# Patient Record
Sex: FEMALE | Race: WHITE | URBAN - METROPOLITAN AREA
[De-identification: names, ages, dates, MRNs, and addresses within clinical notes are randomized per-mention and may not be internally consistent; named-entity substitution may affect disease eponyms.]

---

## 2019-06-25 ENCOUNTER — OUTPATIENT (OUTPATIENT)
Dept: OUTPATIENT SERVICES | Facility: HOSPITAL | Age: 24
LOS: 1 days | Discharge: HOME | End: 2019-06-25

## 2019-06-25 ENCOUNTER — APPOINTMENT (OUTPATIENT)
Dept: OBGYN | Facility: CLINIC | Age: 24
End: 2019-06-25
Payer: MEDICAID

## 2019-06-25 ENCOUNTER — RESULT CHARGE (OUTPATIENT)
Age: 24
End: 2019-06-25

## 2019-06-25 VITALS
WEIGHT: 155.01 LBS | DIASTOLIC BLOOD PRESSURE: 72 MMHG | HEIGHT: 61 IN | SYSTOLIC BLOOD PRESSURE: 120 MMHG | BODY MASS INDEX: 29.27 KG/M2

## 2019-06-25 PROCEDURE — 76857 US EXAM PELVIC LIMITED: CPT | Mod: 26

## 2019-06-25 PROCEDURE — 99203 OFFICE O/P NEW LOW 30 MIN: CPT | Mod: 25

## 2019-06-26 DIAGNOSIS — O28.3 ABNORMAL ULTRASONIC FINDING ON ANTENATAL SCREENING OF MOTHER: ICD-10-CM

## 2019-06-26 LAB
HCG SERPL-MCNC: 1179 MIU/ML
HCG UR QL: POSITIVE
QUALITY CONTROL: YES

## 2019-06-27 ENCOUNTER — APPOINTMENT (OUTPATIENT)
Dept: OBGYN | Facility: CLINIC | Age: 24
End: 2019-06-27
Payer: COMMERCIAL

## 2019-06-27 ENCOUNTER — OUTPATIENT (OUTPATIENT)
Dept: OUTPATIENT SERVICES | Facility: HOSPITAL | Age: 24
LOS: 1 days | Discharge: HOME | End: 2019-06-27

## 2019-06-27 VITALS
HEIGHT: 61 IN | DIASTOLIC BLOOD PRESSURE: 78 MMHG | BODY MASS INDEX: 29.45 KG/M2 | WEIGHT: 156 LBS | SYSTOLIC BLOOD PRESSURE: 118 MMHG

## 2019-06-27 PROCEDURE — 99212 OFFICE O/P EST SF 10 MIN: CPT

## 2019-06-27 NOTE — END OF VISIT
[FreeTextEntry3] : Patient with pregnancy of unknown location. Follow serial quantitative hcg. Precautions given. Follow up next week with Dr. Borges [] : Resident

## 2019-06-28 DIAGNOSIS — O28.3 ABNORMAL ULTRASONIC FINDING ON ANTENATAL SCREENING OF MOTHER: ICD-10-CM

## 2019-07-02 LAB
ABO + RH PNL BLD: NORMAL
HCG SERPL-MCNC: 1955 MIU/ML
HCG SERPL-MCNC: 2938 MIU/ML
PROGEST SERPL-MCNC: 7.1 NG/ML

## 2019-07-03 ENCOUNTER — APPOINTMENT (OUTPATIENT)
Dept: ANTEPARTUM | Facility: CLINIC | Age: 24
End: 2019-07-03

## 2019-07-03 ENCOUNTER — CHART COPY (OUTPATIENT)
Age: 24
End: 2019-07-03

## 2019-07-03 ENCOUNTER — EMERGENCY (EMERGENCY)
Facility: HOSPITAL | Age: 24
LOS: 0 days | Discharge: HOME | End: 2019-07-04
Attending: EMERGENCY MEDICINE | Admitting: EMERGENCY MEDICINE
Payer: SUBSIDIZED

## 2019-07-03 VITALS
RESPIRATION RATE: 18 BRPM | TEMPERATURE: 99 F | DIASTOLIC BLOOD PRESSURE: 68 MMHG | SYSTOLIC BLOOD PRESSURE: 123 MMHG | HEART RATE: 82 BPM | OXYGEN SATURATION: 99 %

## 2019-07-03 VITALS — HEIGHT: 61 IN | WEIGHT: 145.06 LBS

## 2019-07-03 DIAGNOSIS — O00.01 ABDOMINAL PREGNANCY WITH INTRAUTERINE PREGNANCY: ICD-10-CM

## 2019-07-03 DIAGNOSIS — Z3A.01 LESS THAN 8 WEEKS GESTATION OF PREGNANCY: ICD-10-CM

## 2019-07-03 LAB
ANION GAP SERPL CALC-SCNC: 12 MMOL/L — SIGNIFICANT CHANGE UP (ref 7–14)
APPEARANCE UR: ABNORMAL
BACTERIA # UR AUTO: ABNORMAL /HPF
BASOPHILS # BLD AUTO: 0.02 K/UL — SIGNIFICANT CHANGE UP (ref 0–0.2)
BASOPHILS NFR BLD AUTO: 0.2 % — SIGNIFICANT CHANGE UP (ref 0–1)
BILIRUB UR-MCNC: NEGATIVE — SIGNIFICANT CHANGE UP
BUN SERPL-MCNC: 11 MG/DL — SIGNIFICANT CHANGE UP (ref 10–20)
CALCIUM SERPL-MCNC: 9.4 MG/DL — SIGNIFICANT CHANGE UP (ref 8.5–10.1)
CHLORIDE SERPL-SCNC: 103 MMOL/L — SIGNIFICANT CHANGE UP (ref 98–110)
CO2 SERPL-SCNC: 24 MMOL/L — SIGNIFICANT CHANGE UP (ref 17–32)
COLOR SPEC: YELLOW — SIGNIFICANT CHANGE UP
CREAT SERPL-MCNC: 0.8 MG/DL — SIGNIFICANT CHANGE UP (ref 0.7–1.5)
DIFF PNL FLD: NEGATIVE — SIGNIFICANT CHANGE UP
EOSINOPHIL # BLD AUTO: 0.34 K/UL — SIGNIFICANT CHANGE UP (ref 0–0.7)
EOSINOPHIL NFR BLD AUTO: 3 % — SIGNIFICANT CHANGE UP (ref 0–8)
GLUCOSE SERPL-MCNC: 94 MG/DL — SIGNIFICANT CHANGE UP (ref 70–99)
GLUCOSE UR QL: NEGATIVE MG/DL — SIGNIFICANT CHANGE UP
HCG SERPL-ACNC: 6915 MIU/ML — HIGH
HCT VFR BLD CALC: 39.4 % — SIGNIFICANT CHANGE UP (ref 37–47)
HGB BLD-MCNC: 12.9 G/DL — SIGNIFICANT CHANGE UP (ref 12–16)
IMM GRANULOCYTES NFR BLD AUTO: 0.4 % — HIGH (ref 0.1–0.3)
KETONES UR-MCNC: NEGATIVE — SIGNIFICANT CHANGE UP
LEUKOCYTE ESTERASE UR-ACNC: ABNORMAL
LYMPHOCYTES # BLD AUTO: 2.93 K/UL — SIGNIFICANT CHANGE UP (ref 1.2–3.4)
LYMPHOCYTES # BLD AUTO: 25.7 % — SIGNIFICANT CHANGE UP (ref 20.5–51.1)
MCHC RBC-ENTMCNC: 29.3 PG — SIGNIFICANT CHANGE UP (ref 27–31)
MCHC RBC-ENTMCNC: 32.7 G/DL — SIGNIFICANT CHANGE UP (ref 32–37)
MCV RBC AUTO: 89.5 FL — SIGNIFICANT CHANGE UP (ref 81–99)
MONOCYTES # BLD AUTO: 0.67 K/UL — HIGH (ref 0.1–0.6)
MONOCYTES NFR BLD AUTO: 5.9 % — SIGNIFICANT CHANGE UP (ref 1.7–9.3)
NEUTROPHILS # BLD AUTO: 7.38 K/UL — HIGH (ref 1.4–6.5)
NEUTROPHILS NFR BLD AUTO: 64.8 % — SIGNIFICANT CHANGE UP (ref 42.2–75.2)
NITRITE UR-MCNC: NEGATIVE — SIGNIFICANT CHANGE UP
NRBC # BLD: 0 /100 WBCS — SIGNIFICANT CHANGE UP (ref 0–0)
PH UR: 6.5 — SIGNIFICANT CHANGE UP (ref 5–8)
PLATELET # BLD AUTO: 222 K/UL — SIGNIFICANT CHANGE UP (ref 130–400)
POTASSIUM SERPL-MCNC: 4.4 MMOL/L — SIGNIFICANT CHANGE UP (ref 3.5–5)
POTASSIUM SERPL-SCNC: 4.4 MMOL/L — SIGNIFICANT CHANGE UP (ref 3.5–5)
PROT UR-MCNC: NEGATIVE MG/DL — SIGNIFICANT CHANGE UP
RBC # BLD: 4.4 M/UL — SIGNIFICANT CHANGE UP (ref 4.2–5.4)
RBC # FLD: 13.2 % — SIGNIFICANT CHANGE UP (ref 11.5–14.5)
SODIUM SERPL-SCNC: 139 MMOL/L — SIGNIFICANT CHANGE UP (ref 135–146)
SP GR SPEC: 1.02 — SIGNIFICANT CHANGE UP (ref 1.01–1.03)
UROBILINOGEN FLD QL: 1 MG/DL (ref 0.2–0.2)
WBC # BLD: 11.39 K/UL — HIGH (ref 4.8–10.8)
WBC # FLD AUTO: 11.39 K/UL — HIGH (ref 4.8–10.8)
WBC UR QL: SIGNIFICANT CHANGE UP /HPF

## 2019-07-03 PROCEDURE — 99284 EMERGENCY DEPT VISIT MOD MDM: CPT

## 2019-07-03 RX ORDER — SODIUM CHLORIDE 9 MG/ML
1000 INJECTION INTRAMUSCULAR; INTRAVENOUS; SUBCUTANEOUS ONCE
Refills: 0 | Status: COMPLETED | OUTPATIENT
Start: 2019-07-03 | End: 2019-07-03

## 2019-07-03 RX ADMIN — SODIUM CHLORIDE 1000 MILLILITER(S): 9 INJECTION INTRAMUSCULAR; INTRAVENOUS; SUBCUTANEOUS at 21:36

## 2019-07-03 NOTE — ED PROVIDER NOTE - NSFOLLOWUPCLINICS_GEN_ALL_ED_FT
[facility]  OB/GYN  [address] [place] [address]  Phone: [phone]  Fax:   Follow Up Time:

## 2019-07-03 NOTE — ED PROVIDER NOTE - OBJECTIVE STATEMENT
25 y/o female  with no pmhx presents for sonogram. Patient is seen by Dr. Badillo, is on the beta-list; was advised to get a pelvic sono done to evaluate for IUP so patient came into ER today. Patient states she found out she was pregnant last week, has been getting blood work done to follow beta-level. Patient admits to getting outpatient US done by Dr. Badillo last week which did not show IUP. Patient denies fevers/chills, sob, chest pain, abdominal pain, n/v, dysuria, vaginal bleeding, vaginal discharge, hx of STDs. LMP 2019 (hx of irregular periods)

## 2019-07-03 NOTE — ED PROVIDER NOTE - ATTENDING CONTRIBUTION TO CARE
23 yo f sent by OBGYN clinic for US for ectopic eval. pt having trending betas outpt, due for US sent here. pt has no complaints. denies bleeding or pain. no fever. vomited x1 yesterday.   pt in nad, anict, pink conj, neck sup ctab ,rrr, ab soft, nt, nd.   will get labs and US.

## 2019-07-03 NOTE — ED PROVIDER NOTE - NS ED ROS FT
Constitutional: See HPI.  Cardiac: No chest pain, SOB or edema. No chest pain with exertion.  Respiratory: No cough or respiratory distress.   GI: No nausea, vomiting, diarrhea or abdominal pain.  : No dysuria, frequency or burning.  MS: No myalgia, muscle weakness, joint pain or back pain.  Neuro: No headache or weakness. No LOC.  Skin: No skin rash.  Endo: No hx of DM, thyroid disease  Except as documented in HPI, all other review of systems is negative

## 2019-07-03 NOTE — ED PROVIDER NOTE - PROGRESS NOTE DETAILS
Kalpana: spoke to OBGYN resident Yahir - aware of patient in ER and results; advised to follow up as outpatient. Patient understands and agrees with plan.

## 2019-07-03 NOTE — ED ADULT NURSE NOTE - CAS DISCH ACCOMP BY
Self/Significant other
impaired balance/decreased endurance/impaired postural control/decreased strength

## 2019-07-04 PROCEDURE — 76856 US EXAM PELVIC COMPLETE: CPT | Mod: 26

## 2019-07-04 RX ADMIN — SODIUM CHLORIDE 1000 MILLILITER(S): 9 INJECTION INTRAMUSCULAR; INTRAVENOUS; SUBCUTANEOUS at 02:08

## 2019-07-04 NOTE — CHART NOTE - NSCHARTNOTEFT_GEN_A_CORE
Mrs. Gruber presented to the ED     HPI: 23yo  at 14w4d by LMP , with irregular menstural cycles, being followed for pregnancy of unknown location presented to ED. Pregnancy test was first positive on . Asympomatic, denied vaginal bleeding and abdominal cramping.    Labs:  : bhcg 1179, Progesterone 7.1  : O pos; bhcg 1955 (65%)  : bhcg 2938 (47.3%)  : bhcg 4397 (50%)    Imagin/27: BSS: no IUP  7/3: Single live intrauterine pregnancy corresponding to a gestational age of 5 weeks and 6 days. Fetal heart beat is detected.     Ddx initially early pregnancy vs ectopic pregnancy    Pt presented for multiple betas with appropriate rise. went for ED sono  that demonstrated IUP w/ FH. GYN will no longer continue to follow.     Dr. Mir and Dr. Badillo aware. Mrs. Gruber presented to the ED     HPI: 23yo  at 14w4d by LMP , with irregular menstural cycles, being followed for pregnancy of unknown location presented to ED. Pregnancy test was first positive on . Asympomatic, denied vaginal bleeding and abdominal cramping.    Labs:  : bhcg 1179, Progesterone 7.1  : O pos; bhcg 1955 (65%)  : bhcg 2938 (47.3%)  : bhcg 4397 (50%)    Imagin/27: BSS: no IUP  : Single live intrauterine pregnancy corresponding to a gestational age of 5 weeks and 6 days. Fetal heart beat is detected.     Ddx initially early pregnancy vs ectopic pregnancy    Pt presented for multiple betas with appropriate rise. went for ED sono  that demonstrated IUP w/ FH. GYN will no longer continue to follow.     Dr. Mir and Dr. Badillo aware.

## 2019-07-05 LAB — HCG SERPL-MCNC: 4397 MIU/ML

## 2019-07-07 LAB
-  AMIKACIN: SIGNIFICANT CHANGE UP
-  AMPICILLIN/SULBACTAM: SIGNIFICANT CHANGE UP
-  AMPICILLIN: SIGNIFICANT CHANGE UP
-  AZTREONAM: SIGNIFICANT CHANGE UP
-  CEFEPIME: SIGNIFICANT CHANGE UP
-  CEFOXITIN: SIGNIFICANT CHANGE UP
-  CEFTRIAXONE: SIGNIFICANT CHANGE UP
-  CIPROFLOXACIN: SIGNIFICANT CHANGE UP
-  ERTAPENEM: SIGNIFICANT CHANGE UP
-  GENTAMICIN: SIGNIFICANT CHANGE UP
-  IMIPENEM: SIGNIFICANT CHANGE UP
-  LEVOFLOXACIN: SIGNIFICANT CHANGE UP
-  MEROPENEM: SIGNIFICANT CHANGE UP
-  NITROFURANTOIN: SIGNIFICANT CHANGE UP
-  PIPERACILLIN/TAZOBACTAM: SIGNIFICANT CHANGE UP
-  TIGECYCLINE: SIGNIFICANT CHANGE UP
-  TOBRAMYCIN: SIGNIFICANT CHANGE UP
-  TRIMETHOPRIM/SULFAMETHOXAZOLE: SIGNIFICANT CHANGE UP
CULTURE RESULTS: SIGNIFICANT CHANGE UP
METHOD TYPE: SIGNIFICANT CHANGE UP
ORGANISM # SPEC MICROSCOPIC CNT: SIGNIFICANT CHANGE UP
ORGANISM # SPEC MICROSCOPIC CNT: SIGNIFICANT CHANGE UP
SPECIMEN SOURCE: SIGNIFICANT CHANGE UP

## 2019-07-07 RX ORDER — NITROFURANTOIN MACROCRYSTAL 50 MG
1 CAPSULE ORAL
Qty: 14 | Refills: 0
Start: 2019-07-07 | End: 2019-07-13

## 2019-07-15 ENCOUNTER — RESULT CHARGE (OUTPATIENT)
Age: 24
End: 2019-07-15

## 2019-07-15 ENCOUNTER — NON-APPOINTMENT (OUTPATIENT)
Age: 24
End: 2019-07-15

## 2019-07-15 ENCOUNTER — APPOINTMENT (OUTPATIENT)
Dept: OBGYN | Facility: CLINIC | Age: 24
End: 2019-07-15
Payer: MEDICAID

## 2019-07-15 ENCOUNTER — LABORATORY RESULT (OUTPATIENT)
Age: 24
End: 2019-07-15

## 2019-07-15 ENCOUNTER — OUTPATIENT (OUTPATIENT)
Dept: OUTPATIENT SERVICES | Facility: HOSPITAL | Age: 24
LOS: 1 days | Discharge: HOME | End: 2019-07-15

## 2019-07-15 VITALS
BODY MASS INDEX: 28.89 KG/M2 | WEIGHT: 153 LBS | DIASTOLIC BLOOD PRESSURE: 70 MMHG | HEIGHT: 61 IN | SYSTOLIC BLOOD PRESSURE: 120 MMHG

## 2019-07-15 PROCEDURE — 99214 OFFICE O/P EST MOD 30 MIN: CPT | Mod: 25

## 2019-07-15 PROCEDURE — 76815 OB US LIMITED FETUS(S): CPT | Mod: 26

## 2019-07-16 DIAGNOSIS — Z34.90 ENCOUNTER FOR SUPERVISION OF NORMAL PREGNANCY, UNSPECIFIED, UNSPECIFIED TRIMESTER: ICD-10-CM

## 2019-07-16 LAB
BILIRUB UR QL STRIP: NORMAL
C TRACH RRNA SPEC QL NAA+PROBE: NOT DETECTED
CLARITY UR: CLEAR
COLLECTION METHOD: NORMAL
GLUCOSE UR-MCNC: NORMAL
HCG UR QL: 0.2 EU/DL
HGB UR QL STRIP.AUTO: NORMAL
KETONES UR-MCNC: NORMAL
LEUKOCYTE ESTERASE UR QL STRIP: NORMAL
N GONORRHOEA RRNA SPEC QL NAA+PROBE: NOT DETECTED
NITRITE UR QL STRIP: NORMAL
PH UR STRIP: 6
PROT UR STRIP-MCNC: NORMAL
SOURCE AMPLIFICATION: NORMAL
SP GR UR STRIP: 1.02

## 2019-07-17 DIAGNOSIS — Z01.419 ENCOUNTER FOR GYNECOLOGICAL EXAMINATION (GENERAL) (ROUTINE) WITHOUT ABNORMAL FINDINGS: ICD-10-CM

## 2019-07-22 ENCOUNTER — LABORATORY RESULT (OUTPATIENT)
Age: 24
End: 2019-07-22

## 2019-07-23 LAB
ABO + RH PNL BLD: NORMAL
BASOPHILS # BLD AUTO: 0.03 K/UL
BASOPHILS NFR BLD AUTO: 0.3 %
BLD GP AB SCN SERPL QL: NORMAL
EOSINOPHIL # BLD AUTO: 0.36 K/UL
EOSINOPHIL NFR BLD AUTO: 3.4 %
HBV SURFACE AG SER QL: NONREACTIVE
HCT VFR BLD CALC: 38.4 %
HGB BLD-MCNC: 12.7 G/DL
HIV1+2 AB SPEC QL IA.RAPID: NONREACTIVE
IMM GRANULOCYTES NFR BLD AUTO: 0.3 %
LYMPHOCYTES # BLD AUTO: 2.55 K/UL
LYMPHOCYTES NFR BLD AUTO: 23.9 %
MAN DIFF?: NORMAL
MCHC RBC-ENTMCNC: 29.9 PG
MCHC RBC-ENTMCNC: 33.1 G/DL
MCV RBC AUTO: 90.4 FL
MONOCYTES # BLD AUTO: 0.6 K/UL
MONOCYTES NFR BLD AUTO: 5.6 %
NEUTROPHILS # BLD AUTO: 7.08 K/UL
NEUTROPHILS NFR BLD AUTO: 66.5 %
PLATELET # BLD AUTO: 247 K/UL
RBC # BLD: 4.25 M/UL
RBC # FLD: 13.2 %
WBC # FLD AUTO: 10.65 K/UL

## 2019-07-24 LAB
BACTERIA UR CULT: NORMAL
LEAD BLD-MCNC: <1 UG/DL
MEV IGG FLD QL IA: <5 AU/ML
MEV IGG+IGM SER-IMP: NEGATIVE
RUBV IGG FLD-ACNC: 1.7 INDEX
RUBV IGG SER-IMP: POSITIVE
VZV AB TITR SER: NEGATIVE
VZV IGG SER IF-ACNC: 114.8 INDEX

## 2019-07-26 LAB
HGB A MFR BLD: 97.5 %
HGB A2 MFR BLD: 2.5 %
HGB FRACT BLD-IMP: NORMAL
T PALLIDUM AB SER QL IA: NEGATIVE

## 2019-07-29 LAB
AR GENE MUT ANL BLD/T: NEGATIVE
CFTR MUT TESTED BLD/T: NEGATIVE

## 2019-08-07 ENCOUNTER — APPOINTMENT (OUTPATIENT)
Dept: OBGYN | Facility: CLINIC | Age: 24
End: 2019-08-07

## 2019-08-16 ENCOUNTER — APPOINTMENT (OUTPATIENT)
Dept: ANTEPARTUM | Facility: CLINIC | Age: 24
End: 2019-08-16
Payer: MEDICAID

## 2019-08-16 ENCOUNTER — OUTPATIENT (OUTPATIENT)
Dept: OUTPATIENT SERVICES | Facility: HOSPITAL | Age: 24
LOS: 1 days | Discharge: HOME | End: 2019-08-16

## 2019-08-16 DIAGNOSIS — Z13.79 ENCOUNTER FOR OTHER SCREENING FOR GENETIC AND CHROMOSOMAL ANOMALIES: ICD-10-CM

## 2019-08-16 DIAGNOSIS — Z36.82 ENCOUNTER FOR ANTENATAL SCREENING FOR NUCHAL TRANSLUCENCY: ICD-10-CM

## 2019-08-16 PROCEDURE — 36415 COLL VENOUS BLD VENIPUNCTURE: CPT

## 2019-08-16 PROCEDURE — 76813 OB US NUCHAL MEAS 1 GEST: CPT | Mod: 26

## 2019-08-21 ENCOUNTER — APPOINTMENT (OUTPATIENT)
Dept: OBGYN | Facility: CLINIC | Age: 24
End: 2019-08-21
Payer: MEDICAID

## 2019-08-21 ENCOUNTER — OUTPATIENT (OUTPATIENT)
Dept: OUTPATIENT SERVICES | Facility: HOSPITAL | Age: 24
LOS: 1 days | Discharge: HOME | End: 2019-08-21

## 2019-08-21 ENCOUNTER — APPOINTMENT (OUTPATIENT)
Dept: OBGYN | Facility: CLINIC | Age: 24
End: 2019-08-21

## 2019-08-21 ENCOUNTER — RESULT CHARGE (OUTPATIENT)
Age: 24
End: 2019-08-21

## 2019-08-21 VITALS
BODY MASS INDEX: 27.94 KG/M2 | HEIGHT: 61 IN | WEIGHT: 148 LBS | SYSTOLIC BLOOD PRESSURE: 114 MMHG | DIASTOLIC BLOOD PRESSURE: 66 MMHG

## 2019-08-21 LAB
BILIRUB UR QL STRIP: NORMAL
CLARITY UR: CLEAR
COLLECTION METHOD: NORMAL
GLUCOSE UR-MCNC: NORMAL
HCG UR QL: 0.2 EU/DL
HGB UR QL STRIP.AUTO: NORMAL
KETONES UR-MCNC: NORMAL
LEUKOCYTE ESTERASE UR QL STRIP: NORMAL
NITRITE UR QL STRIP: POSITIVE
PH UR STRIP: 6
PROT UR STRIP-MCNC: NORMAL
SP GR UR STRIP: 1.02

## 2019-08-21 PROCEDURE — 99213 OFFICE O/P EST LOW 20 MIN: CPT

## 2019-08-21 RX ORDER — METOCLOPRAMIDE 10 MG/1
10 TABLET ORAL 4 TIMES DAILY
Qty: 120 | Refills: 5 | Status: ACTIVE | COMMUNITY
Start: 2019-08-21 | End: 1900-01-01

## 2019-08-27 DIAGNOSIS — Z34.90 ENCOUNTER FOR SUPERVISION OF NORMAL PREGNANCY, UNSPECIFIED, UNSPECIFIED TRIMESTER: ICD-10-CM

## 2019-09-18 ENCOUNTER — RESULT CHARGE (OUTPATIENT)
Age: 24
End: 2019-09-18

## 2019-09-18 ENCOUNTER — APPOINTMENT (OUTPATIENT)
Dept: OBGYN | Facility: CLINIC | Age: 24
End: 2019-09-18
Payer: MEDICAID

## 2019-09-18 ENCOUNTER — OUTPATIENT (OUTPATIENT)
Dept: OUTPATIENT SERVICES | Facility: HOSPITAL | Age: 24
LOS: 1 days | Discharge: HOME | End: 2019-09-18

## 2019-09-18 VITALS
HEIGHT: 61 IN | DIASTOLIC BLOOD PRESSURE: 66 MMHG | WEIGHT: 147 LBS | SYSTOLIC BLOOD PRESSURE: 100 MMHG | BODY MASS INDEX: 27.75 KG/M2

## 2019-09-18 PROCEDURE — 99213 OFFICE O/P EST LOW 20 MIN: CPT

## 2019-09-18 RX ORDER — NITROFURANTOIN (MONOHYDRATE/MACROCRYSTALS) 25; 75 MG/1; MG/1
100 CAPSULE ORAL
Qty: 14 | Refills: 0 | Status: ACTIVE | COMMUNITY
Start: 2019-09-18 | End: 1900-01-01

## 2019-09-23 DIAGNOSIS — Z34.90 ENCOUNTER FOR SUPERVISION OF NORMAL PREGNANCY, UNSPECIFIED, UNSPECIFIED TRIMESTER: ICD-10-CM

## 2019-10-04 ENCOUNTER — OUTPATIENT (OUTPATIENT)
Dept: OUTPATIENT SERVICES | Facility: HOSPITAL | Age: 24
LOS: 1 days | Discharge: HOME | End: 2019-10-04

## 2019-10-04 ENCOUNTER — APPOINTMENT (OUTPATIENT)
Dept: ANTEPARTUM | Facility: CLINIC | Age: 24
End: 2019-10-04
Payer: MEDICAID

## 2019-10-04 PROCEDURE — 76805 OB US >/= 14 WKS SNGL FETUS: CPT | Mod: 26

## 2019-10-04 PROCEDURE — ZZZZZ: CPT

## 2019-10-09 ENCOUNTER — APPOINTMENT (OUTPATIENT)
Dept: ANTEPARTUM | Facility: CLINIC | Age: 24
End: 2019-10-09

## 2019-10-21 ENCOUNTER — OUTPATIENT (OUTPATIENT)
Dept: OUTPATIENT SERVICES | Facility: HOSPITAL | Age: 24
LOS: 1 days | Discharge: HOME | End: 2019-10-21

## 2019-10-21 ENCOUNTER — APPOINTMENT (OUTPATIENT)
Dept: OBGYN | Facility: CLINIC | Age: 24
End: 2019-10-21
Payer: MEDICAID

## 2019-10-21 VITALS — DIASTOLIC BLOOD PRESSURE: 64 MMHG | SYSTOLIC BLOOD PRESSURE: 100 MMHG | WEIGHT: 150 LBS

## 2019-10-21 PROCEDURE — 99213 OFFICE O/P EST LOW 20 MIN: CPT

## 2019-10-21 RX ORDER — SULFAMETHOXAZOLE AND TRIMETHOPRIM 800; 160 MG/1; MG/1
800-160 TABLET ORAL TWICE DAILY
Qty: 14 | Refills: 0 | Status: ACTIVE | COMMUNITY
Start: 2019-10-21 | End: 1900-01-01

## 2019-10-22 LAB
BILIRUB UR QL STRIP: NORMAL
CLARITY UR: CLEAR
COLLECTION METHOD: NORMAL
GLUCOSE UR-MCNC: NORMAL
HCG UR QL: 0.2 EU/DL
HGB UR QL STRIP.AUTO: NORMAL
KETONES UR-MCNC: NORMAL
LEUKOCYTE ESTERASE UR QL STRIP: NORMAL
NITRITE UR QL STRIP: POSITIVE
PH UR STRIP: 5
PROT UR STRIP-MCNC: NORMAL
SP GR UR STRIP: 1.01

## 2019-10-25 DIAGNOSIS — Z34.90 ENCOUNTER FOR SUPERVISION OF NORMAL PREGNANCY, UNSPECIFIED, UNSPECIFIED TRIMESTER: ICD-10-CM

## 2019-10-28 LAB
1ST TRIMESTER DATA: NORMAL
2ND TRIMESTER DATA: NORMAL
AFP PNL SERPL: NORMAL
AFP SERPL-ACNC: NORMAL
AFP SERPL-ACNC: NORMAL
B-HCG FREE SERPL-MCNC: NORMAL
BACTERIA UR CULT: ABNORMAL
CLINICAL BIOCHEMIST REVIEW: NORMAL
FREE BETA HCG 1ST TRIMESTER: NORMAL
INHIBIN A SERPL-MCNC: NORMAL
INHIBIN-A 1ST TRIMESTER: NORMAL
NASAL BONE: PRESENT
NOTES NTD: NORMAL
NT: NORMAL
PAPP-A SERPL-ACNC: NORMAL
PIGF SER-MCNC: NORMAL
U ESTRIOL SERPL-SCNC: NORMAL

## 2019-11-27 ENCOUNTER — RESULT CHARGE (OUTPATIENT)
Age: 24
End: 2019-11-27

## 2019-11-27 ENCOUNTER — APPOINTMENT (OUTPATIENT)
Dept: OBGYN | Facility: CLINIC | Age: 24
End: 2019-11-27
Payer: MEDICAID

## 2019-11-27 ENCOUNTER — OUTPATIENT (OUTPATIENT)
Dept: OUTPATIENT SERVICES | Facility: HOSPITAL | Age: 24
LOS: 1 days | Discharge: HOME | End: 2019-11-27

## 2019-11-27 ENCOUNTER — APPOINTMENT (OUTPATIENT)
Dept: OBGYN | Facility: CLINIC | Age: 24
End: 2019-11-27

## 2019-11-27 VITALS — WEIGHT: 153 LBS | SYSTOLIC BLOOD PRESSURE: 110 MMHG | DIASTOLIC BLOOD PRESSURE: 60 MMHG

## 2019-11-27 LAB
BILIRUB UR QL STRIP: NORMAL
CLARITY UR: CLEAR
COLLECTION METHOD: NORMAL
GLUCOSE UR-MCNC: NORMAL
HCG UR QL: 0.2 EU/DL
HGB UR QL STRIP.AUTO: NORMAL
KETONES UR-MCNC: NORMAL
LEUKOCYTE ESTERASE UR QL STRIP: NORMAL
NITRITE UR QL STRIP: NORMAL
PH UR STRIP: 6
PROT UR STRIP-MCNC: NORMAL
SP GR UR STRIP: 1.01

## 2019-11-27 PROCEDURE — 99213 OFFICE O/P EST LOW 20 MIN: CPT

## 2019-11-29 DIAGNOSIS — Z34.90 ENCOUNTER FOR SUPERVISION OF NORMAL PREGNANCY, UNSPECIFIED, UNSPECIFIED TRIMESTER: ICD-10-CM

## 2019-12-10 LAB
BASOPHILS # BLD AUTO: 0.02 K/UL
BASOPHILS NFR BLD AUTO: 0.2 %
EOSINOPHIL # BLD AUTO: 0.15 K/UL
EOSINOPHIL NFR BLD AUTO: 1.5 %
HCT VFR BLD CALC: 34.7 %
HGB BLD-MCNC: 11.3 G/DL
IMM GRANULOCYTES NFR BLD AUTO: 0.7 %
LYMPHOCYTES # BLD AUTO: 1.7 K/UL
LYMPHOCYTES NFR BLD AUTO: 16.7 %
MAN DIFF?: NORMAL
MCHC RBC-ENTMCNC: 30.2 PG
MCHC RBC-ENTMCNC: 32.6 G/DL
MCV RBC AUTO: 92.8 FL
MONOCYTES # BLD AUTO: 0.45 K/UL
MONOCYTES NFR BLD AUTO: 4.4 %
NEUTROPHILS # BLD AUTO: 7.79 K/UL
NEUTROPHILS NFR BLD AUTO: 76.5 %
PLATELET # BLD AUTO: 242 K/UL
RBC # BLD: 3.74 M/UL
RBC # FLD: 12.3 %
WBC # FLD AUTO: 10.18 K/UL

## 2019-12-24 ENCOUNTER — OUTPATIENT (OUTPATIENT)
Dept: OUTPATIENT SERVICES | Facility: HOSPITAL | Age: 24
LOS: 1 days | Discharge: HOME | End: 2019-12-24

## 2019-12-24 ENCOUNTER — RESULT CHARGE (OUTPATIENT)
Age: 24
End: 2019-12-24

## 2019-12-24 ENCOUNTER — NON-APPOINTMENT (OUTPATIENT)
Age: 24
End: 2019-12-24

## 2019-12-24 ENCOUNTER — APPOINTMENT (OUTPATIENT)
Dept: OBGYN | Facility: CLINIC | Age: 24
End: 2019-12-24
Payer: MEDICAID

## 2019-12-24 ENCOUNTER — APPOINTMENT (OUTPATIENT)
Dept: ANTEPARTUM | Facility: CLINIC | Age: 24
End: 2019-12-24
Payer: MEDICAID

## 2019-12-24 VITALS — SYSTOLIC BLOOD PRESSURE: 106 MMHG | DIASTOLIC BLOOD PRESSURE: 60 MMHG | WEIGHT: 158 LBS

## 2019-12-24 LAB
BILIRUB UR QL STRIP: NEGATIVE
CLARITY UR: CLEAR
COLLECTION METHOD: NORMAL
GLUCOSE 1H P 50 G GLC PO SERPL-MCNC: 140 MG/DL
GLUCOSE UR-MCNC: NEGATIVE
HCG UR QL: NEGATIVE EU/DL
HGB UR QL STRIP.AUTO: NEGATIVE
KETONES UR-MCNC: NEGATIVE
LEUKOCYTE ESTERASE UR QL STRIP: NEGATIVE
NITRITE UR QL STRIP: NEGATIVE
PH UR STRIP: 6
PROT UR STRIP-MCNC: NORMAL
SP GR UR STRIP: 1.02

## 2019-12-24 PROCEDURE — 99213 OFFICE O/P EST LOW 20 MIN: CPT

## 2019-12-26 DIAGNOSIS — Z34.90 ENCOUNTER FOR SUPERVISION OF NORMAL PREGNANCY, UNSPECIFIED, UNSPECIFIED TRIMESTER: ICD-10-CM

## 2020-01-05 LAB
GLUCOSE 1H P 100 G GLC PO SERPL-MCNC: 150 MG/DL
GLUCOSE 2H P CHAL SERPL-MCNC: 128 MG/DL
GLUCOSE 3H P CHAL SERPL-MCNC: 115 MG/DL
GLUCOSE BS SERPL-MCNC: 72 MG/DL

## 2020-01-07 ENCOUNTER — APPOINTMENT (OUTPATIENT)
Dept: OBGYN | Facility: CLINIC | Age: 25
End: 2020-01-07
Payer: MEDICAID

## 2020-01-07 ENCOUNTER — RESULT CHARGE (OUTPATIENT)
Age: 25
End: 2020-01-07

## 2020-01-07 ENCOUNTER — OUTPATIENT (OUTPATIENT)
Dept: OUTPATIENT SERVICES | Facility: HOSPITAL | Age: 25
LOS: 1 days | Discharge: HOME | End: 2020-01-07

## 2020-01-07 ENCOUNTER — NON-APPOINTMENT (OUTPATIENT)
Age: 25
End: 2020-01-07

## 2020-01-07 VITALS
BODY MASS INDEX: 29.74 KG/M2 | SYSTOLIC BLOOD PRESSURE: 108 MMHG | WEIGHT: 157.5 LBS | DIASTOLIC BLOOD PRESSURE: 68 MMHG | HEIGHT: 61 IN

## 2020-01-07 PROCEDURE — 99213 OFFICE O/P EST LOW 20 MIN: CPT

## 2020-01-08 LAB
BILIRUB UR QL STRIP: NORMAL
CLARITY UR: CLEAR
COLLECTION METHOD: NORMAL
GLUCOSE UR-MCNC: NORMAL
HCG UR QL: 0.2 EU/DL
HGB UR QL STRIP.AUTO: NORMAL
KETONES UR-MCNC: NORMAL
LEUKOCYTE ESTERASE UR QL STRIP: NORMAL
NITRITE UR QL STRIP: NORMAL
PH UR STRIP: 7
PROT UR STRIP-MCNC: NORMAL
SP GR UR STRIP: 1.01

## 2020-01-10 DIAGNOSIS — Z34.03 ENCOUNTER FOR SUPERVISION OF NORMAL FIRST PREGNANCY, THIRD TRIMESTER: ICD-10-CM

## 2020-01-21 ENCOUNTER — RESULT CHARGE (OUTPATIENT)
Age: 25
End: 2020-01-21

## 2020-01-21 ENCOUNTER — OUTPATIENT (OUTPATIENT)
Dept: OUTPATIENT SERVICES | Facility: HOSPITAL | Age: 25
LOS: 1 days | Discharge: HOME | End: 2020-01-21

## 2020-01-21 ENCOUNTER — APPOINTMENT (OUTPATIENT)
Dept: OBGYN | Facility: CLINIC | Age: 25
End: 2020-01-21
Payer: MEDICAID

## 2020-01-21 ENCOUNTER — NON-APPOINTMENT (OUTPATIENT)
Age: 25
End: 2020-01-21

## 2020-01-21 VITALS
HEIGHT: 61 IN | BODY MASS INDEX: 29.83 KG/M2 | SYSTOLIC BLOOD PRESSURE: 120 MMHG | WEIGHT: 158 LBS | DIASTOLIC BLOOD PRESSURE: 78 MMHG

## 2020-01-21 LAB
BILIRUB UR QL STRIP: NORMAL
CLARITY UR: CLEAR
COLLECTION METHOD: NORMAL
GLUCOSE UR-MCNC: NORMAL
HCG UR QL: 0.2 EU/DL
HGB UR QL STRIP.AUTO: NORMAL
KETONES UR-MCNC: NORMAL
LEUKOCYTE ESTERASE UR QL STRIP: NORMAL
NITRITE UR QL STRIP: NORMAL
PH UR STRIP: 6.5
PROT UR STRIP-MCNC: NORMAL
SP GR UR STRIP: 1.02

## 2020-01-21 PROCEDURE — 99213 OFFICE O/P EST LOW 20 MIN: CPT

## 2020-01-22 DIAGNOSIS — Z34.03 ENCOUNTER FOR SUPERVISION OF NORMAL FIRST PREGNANCY, THIRD TRIMESTER: ICD-10-CM

## 2020-01-31 ENCOUNTER — OUTPATIENT (OUTPATIENT)
Dept: OUTPATIENT SERVICES | Facility: HOSPITAL | Age: 25
LOS: 1 days | Discharge: HOME | End: 2020-01-31
Payer: MEDICAID

## 2020-01-31 VITALS — DIASTOLIC BLOOD PRESSURE: 73 MMHG | SYSTOLIC BLOOD PRESSURE: 118 MMHG | HEART RATE: 84 BPM

## 2020-01-31 VITALS — TEMPERATURE: 99 F

## 2020-01-31 PROCEDURE — 99282 EMERGENCY DEPT VISIT SF MDM: CPT | Mod: 25

## 2020-01-31 PROCEDURE — 59025 FETAL NON-STRESS TEST: CPT | Mod: 26

## 2020-01-31 NOTE — OB PROVIDER TRIAGE NOTE - NS_MATERNALCONCERNS_OBGYN_ALL_OB_FT
multiple times proteinuria throughout pregnancy, LGSIL w/ HRHPV pos (HPV 16 and 18 neg), elevated GCT, GTT Wnl, w/ poor weight gain

## 2020-01-31 NOTE — OB PROVIDER TRIAGE NOTE - NSOBPROVIDERNOTE_OBGYN_ALL_OB_FT
multiple times proteinuria throughout pregnancy, LGSIL w/ HRHPV pos (HPV 16 and 18 neg), elevated GCT, GTT Wnl, w/ poor weight gain    -Needs MMR PP 25 y/o  @ 36.1  weeks intact membranes, reactive NST not in labor  d/c to home with LAbor precautions  [facility]  RTC next appointment  d/W Dr Garcia    -Needs DOROTHY PP 25 y/o  @ 36.1  weeks intact membranes, reactive NST not in labor  d/c to home with LAbor precautions  [facility]  RTC next appointment  d/W Dr Garcia    -Needs MMR PP    OB ATTENDING: Agree with resident note, not in labor and not ruptured. NST reviewed. Follow up outpatient. -L. BAHOUTH

## 2020-01-31 NOTE — OB PROVIDER TRIAGE NOTE - NSHPLABSRESULTS_GEN_ALL_CORE
GTT 72/150/128/115    10/22/2019:  UCx: 50,000-99,000 Staph sapro    7/22/2019:  Hep B surface antigen nonreactive  Rubella imune  Measles/Varicella nonimmune  Lead <1  RPR neg  SMA carrier status neg  CF carrier status neg  O pos, no antibodies detected  HIV nonreactive    7/15/2019:   Pap smear: LGSIL, HRHPV pos (HPV 16 and 18 neg)  GC not detected   GTT 72/150/128/115  Seq I and II low risk    10/22/2019:  UCx: 50,000-99,000 Staph sapro    7/22/2019:  Hep B surface antigen nonreactive  Rubella imune  Measles/Varicella nonimmune  Lead <1  RPR neg  SMA carrier status neg  CF carrier status neg  O pos, no antibodies detected  HIV nonreactive    7/15/2019:   Pap smear: LGSIL, HRHPV pos (HPV 16 and 18 neg)  GC not detected    Sonos:   19w0d:  grams, anatomy wnl   12w0d: nuchal wnl, posterior placenta   GTT 72/150/128/115  Seq I and II low risk    10/22/2019:  UCx: 50,000-99,000 Staph sapro    7/22/2019:  Hep B surface antigen nonreactive  Rubella immune  Measles/Varicella nonimmune  Lead <1  RPR neg  SMA carrier status neg  CF carrier status neg  O pos, no antibodies detected  HIV nonreactive    7/15/2019:   Pap smear: LGSIL, HRHPV pos (HPV 16 and 18 neg)  GC not detected    Sonos:   19w0d:  grams, anatomy wnl   12w0d: nuchal wnl, posterior placenta

## 2020-01-31 NOTE — OB RN TRIAGE NOTE - NS_TRIAGETIMEOFMEDICALSCREENING_OBGYN_ALL_OB_DT
Per [facility] patient discharged home with [facility] 01/02/2018. No Transitions of Care Outreach due to patient discharge disposition. This note will not be viewable in [address] 31-Jan-2020 07:42

## 2020-01-31 NOTE — OB PROVIDER TRIAGE NOTE - NSHPPHYSICALEXAM_GEN_ALL_CORE
Vital Signs Last 24 Hrs  T(C): 37 (31 Jan 2020 07:33), Max: 37 (31 Jan 2020 07:26)  T(F): 98.6 (31 Jan 2020 07:33), Max: 98.6 (31 Jan 2020 07:26)  HR: 96 (31 Jan 2020 07:35) (96 - 96)  BP: 124/78 (31 Jan 2020 07:35) (124/78 - 124/78)  RR: 18 (31 Jan 2020 07:33) (18 - 18)    Bedside sono: MVP 4.14 cm, cephalic, anterior placenta

## 2020-01-31 NOTE — OB PROVIDER TRIAGE NOTE - FAMILY HISTORY
Family history of hypertension     Mother  Still living? Unknown  Family history of diabetes mellitus, Age at diagnosis: Age Unknown

## 2020-01-31 NOTE — OB PROVIDER TRIAGE NOTE - HISTORY OF PRESENT ILLNESS
multiple times proteinuria throughout pregnancy, LGSIL w/ HRHPV pos (HPV 16 and 18 neg), elevated GCT, GTT Wnl, w/ poor weight gain 25 y/o  @ 36.1 weeks brando 20 dated by 5 weeks onogram who presents to l & D c/o leaking fluid @ 630 clear with some cramping that began @ 530 this morning.  Pt reports + fetal movement, no vaginal bleeding

## 2020-02-01 ENCOUNTER — OUTPATIENT (OUTPATIENT)
Dept: OUTPATIENT SERVICES | Facility: HOSPITAL | Age: 25
LOS: 1 days | Discharge: HOME | End: 2020-02-01
Payer: MEDICAID

## 2020-02-01 VITALS — DIASTOLIC BLOOD PRESSURE: 72 MMHG | HEART RATE: 84 BPM | SYSTOLIC BLOOD PRESSURE: 110 MMHG

## 2020-02-01 VITALS — TEMPERATURE: 98 F

## 2020-02-01 PROCEDURE — 59025 FETAL NON-STRESS TEST: CPT | Mod: 26

## 2020-02-01 PROCEDURE — 99282 EMERGENCY DEPT VISIT SF MDM: CPT | Mod: 25

## 2020-02-01 PROCEDURE — 76815 OB US LIMITED FETUS(S): CPT | Mod: 26

## 2020-02-01 NOTE — OB PROVIDER TRIAGE NOTE - NSOBPROVIDERNOTE_OBGYN_ALL_OB_FT
25 yo  at 36w2d, GBS unknown, proteinuria throughout pregnancy, LGSIL w/ HRHPV pos (HPV 16 and 18 neg) diagnosed in first trimester, elevated GCT, GTT wnl, w/ poor weight gain,     -Needs MMR PP     Dr. Davila and Dr. Stout to be made aware. 23 yo  at 36w2d, GBS unknown, proteinuria throughout pregnancy, LGSIL w/ HRHPV pos (HPV 16 and 18 neg) diagnosed in first trimester, elevated GCT, GTT wnl, w/ poor weight gain, w/ headache mild now, no elevated BPs since arrival, all other symptoms resolved, not in  labor, maternal and fetal wellbeing reassured, BPP 10/10,     - labor precautions/fetal kick count instructions  -Preeclampsia precautions  -Encourage ambulation/PO hydration  -F/u with PMD as scheduled outpt  -Needs MMR PP   -Discharge home     Dr. Davila and Dr. Stout aware. 23 yo  at 36w2d, GBS unknown, w/ resolved headache after tylenol, no elevated BPs since arrival, not in  labor, maternal and fetal wellbeing reassured, BPP 10/10    - labor precautions/fetal kick count instructions  -Preeclampsia precautions  -Encourage ambulation/PO hydration  -F/u with PMD as scheduled outpt - has growth scan this week  -Discharge home     Dr. Davila and Dr. Stout aware.

## 2020-02-01 NOTE — OB PROVIDER TRIAGE NOTE - HISTORY OF PRESENT ILLNESS
23 yo  at 36w2d w/ RETA of 2020 by 1st trimester sonogram here for vomiting and elevated BP measured at home. Last was seen in our triage yesterday for r/o ROM and contractions, was found to be not ruptured and not in labor. Her VE was 1/L/P. Throughout pregnany, pt had proteinuria, in the first trimester had LGSIL on pap smear w/ HRHPV pos (HPV 16 and 18 neg), elevated GCT, GTT Wnl, w/ poor weight gain. Otherwise no complications in this pregnancy.     SH: denies tobacco, alcohol and illicit drug use 23 yo  at 36w2d w/ RETA of 2020 by 1st trimester sonogram here for nausea/vomiting and elevated BP measured at home. Pt is not sure how high the BP. She also reports that she had a headache since 1900, bilateral frontal, 7/10 intensity, throbbing in quality. Tylenol helped very little with it but it is much better now. Reports that she had one episode of vomiting at around 4541-8777, nonbloody, nonbilious. Now denies nausea. Also reports feeling chest heaviness and palpitations at around the same time she had N/V and the headache. Now resolved. Denies blurry vision and RUQ/epigastric pain. Also denies SOB and chest pain. Denies contractions, LOF and VB. Feels good FM but reports that baby is "lazier," movements are not as forceful. Denies fever/chills, diarrhea, sore throat, cough, runny nose, dysuria, frequency, urgency, hematuria, abnormal vaginal discharge, sick contacts and recent travel. Denies any elevated BPs in this pregnancy. Last was seen in our triage yesterday for r/o ROM and contractions, was found to be not ruptured and not in labor. Her VE was 1/L/P. Throughout pregnany, pt had proteinuria; in the first trimester had LGSIL on pap smear w/ HRHPV pos (HPV 16 and 18 neg), elevated GCT, GTT Wnl, w/ poor weight gain. Otherwise no complications in this pregnancy.     SH: denies tobacco, alcohol and illicit drug use 23 yo  at 36w2d w/ RETA of 2020 by 1st trimester sonogram here for nausea/vomiting and elevated BP measured at home. Pt is not sure how high the BP. She also reports that she had a headache since 1900, bilateral frontal, 7/10 intensity, throbbing in quality. Tylenol helped very little with it but it is much better now. Reports that she had one episode of vomiting at around 8048-7286, nonbloody, nonbilious. Now denies nausea. Also reports feeling chest heaviness and palpitations at around the same time she had N/V and the headache. Now resolved. Denies blurry vision and RUQ/epigastric pain. Also denies SOB and chest pain. Denies contractions, LOF and VB. Feels good FM but reports that baby is "lazier," movements are not as forceful. Denies fever/chills, diarrhea, sore throat, cough, runny nose, dysuria, frequency, urgency, hematuria, abnormal vaginal discharge, sick contacts and recent travel. Denies any elevated BPs in this pregnancy. Last was seen in our triage yesterday for r/o ROM and contractions, was found to be not ruptured and not in labor. Her VE was 1/L/P. Throughout pregnany, pt had proteinuria on dips, no elevated BPs; in the first trimester had LGSIL on pap smear w/ HRHPV pos (HPV 16 and 18 neg), elevated GCT, GTT Wnl, w/ poor weight gain. Otherwise no complications in this pregnancy.     SH: denies tobacco, alcohol and illicit drug use

## 2020-02-01 NOTE — OB PROVIDER TRIAGE NOTE - NSHPLABSRESULTS_GEN_ALL_CORE
GTT 72/150/128/115  Seq I and II low risk    10/22/2019:  UCx: 50,000-99,000 Staph sapro    7/22/2019:  Hep B surface antigen nonreactive  Rubella immune  Measles/Varicella nonimmune  Lead <1  RPR neg  SMA carrier status neg  CF carrier status neg  O pos, no antibodies detected  HIV nonreactive    7/15/2019:   Pap smear: LGSIL, HRHPV pos (HPV 16 and 18 neg)  GC not detected    Sonos:   19w0d:  grams, anatomy wnl   12w0d: nuchal wnl, posterior placenta

## 2020-02-01 NOTE — OB PROVIDER TRIAGE NOTE - NSHPPHYSICALEXAM_GEN_ALL_CORE
Vital Signs Last 24 Hrs  T(C): 36.5 (01 Feb 2020 22:12), Max: 36.9 (01 Feb 2020 22:05)  T(F): 97.7 (01 Feb 2020 22:12), Max: 98.4 (01 Feb 2020 22:05)  HR: 100 (01 Feb 2020 22:12) (100 - 100)  BP: 128/76 (01 Feb 2020 22:12) (128/76 - 128/76)  RR: 18 (01 Feb 2020 22:12) (18 - 18)    EFM: 150/mod/accel+  [place]: no contractions    Gen: AAOx3, NAD  Heart: RRR, no extra heart sounds or murmurs  Lungs: Clear to auscultation bilaterally   SVE:   Abd: NT, gravid, no palpable contractions, no RUQ/epigastric tenderness   Neuro: UE reflexes 2+ bilaterally  Extremities: No calf tenderness or edema bilaterally Vital Signs Last 24 Hrs  T(C): 36.5 (01 Feb 2020 22:12), Max: 36.9 (01 Feb 2020 22:05)  T(F): 97.7 (01 Feb 2020 22:12), Max: 98.4 (01 Feb 2020 22:05)  HR: 100 (01 Feb 2020 22:12) (100 - 100)  BP: 128/76 (01 Feb 2020 22:12) (128/76 - 128/76)  RR: 18 (01 Feb 2020 22:12) (18 - 18)    EFM: 150/mod/accel+  [place]: no contractions  Bedside sono:     Gen: AAOx3, NAD  Heart: RRR, no extra heart sounds or murmurs  Lungs: Clear to auscultation bilaterally   SVE: 1/L/P, intact, vertex by sono  Abd: NT, gravid, no palpable contractions, no RUQ/epigastric tenderness   Neuro: UE reflexes 2+ bilaterally  Extremities: No calf tenderness or edema bilaterally Vital Signs Last 24 Hrs  T(C): 36.5 (01 Feb 2020 22:12), Max: 36.9 (01 Feb 2020 22:05)  T(F): 97.7 (01 Feb 2020 22:12), Max: 98.4 (01 Feb 2020 22:05)  HR: 100 (01 Feb 2020 22:12) (100 - 100)  BP: 128/76 (01 Feb 2020 22:12) (128/76 - 128/76)  RR: 18 (01 Feb 2020 22:12) (18 - 18)    EFM: 150/mod/accel+  [place]: no contractions  Bedside sono: EFW 3358 grams (90%ile), BPP 8/8, MVP 6.40 cm, cephalic, fundal placenta,  bpm    Gen: AAOx3, NAD  Heart: RRR, no extra heart sounds or murmurs  Lungs: Clear to auscultation bilaterally   SVE: 1/L/P, intact, vertex by sono  Abd: NT, gravid, no palpable contractions, no RUQ/epigastric tenderness   Neuro: UE reflexes 2+ bilaterally  Extremities: No calf tenderness or edema bilaterally Vital Signs Last 24 Hrs  T(C): 36.5 (01 Feb 2020 22:12), Max: 36.9 (01 Feb 2020 22:05)  T(F): 97.7 (01 Feb 2020 22:12), Max: 98.4 (01 Feb 2020 22:05)  HR: 100 (01 Feb 2020 22:12) (100 - 100)  BP: 128/76 (01 Feb 2020 22:12) (128/76 - 128/76), BP repeat a few times, all normal  RR: 18 (01 Feb 2020 22:12) (18 - 18)    EFM: 150/mod/accel+, no deccels, NST reactive   [place]: no contractions  Bedside sono: EFW 3358 grams (90%ile), BPP 8/8, MVP 6.40 cm, cephalic, fundal placenta,  bpm    Gen: AAOx3, NAD  Heart: RRR, no extra heart sounds or murmurs  Lungs: Clear to auscultation bilaterally   SVE: 1/L/P, intact, vertex by sono  Abd: NT, gravid, no palpable contractions, no RUQ/epigastric tenderness   Neuro: UE reflexes 2+ bilaterally  Extremities: No calf tenderness or edema bilaterally

## 2020-02-02 PROBLEM — Z78.9 OTHER SPECIFIED HEALTH STATUS: Chronic | Status: ACTIVE | Noted: 2020-01-31

## 2020-02-04 ENCOUNTER — APPOINTMENT (OUTPATIENT)
Dept: OBGYN | Facility: CLINIC | Age: 25
End: 2020-02-04
Payer: MEDICAID

## 2020-02-04 ENCOUNTER — OUTPATIENT (OUTPATIENT)
Dept: OUTPATIENT SERVICES | Facility: HOSPITAL | Age: 25
LOS: 1 days | Discharge: HOME | End: 2020-02-04

## 2020-02-04 ENCOUNTER — APPOINTMENT (OUTPATIENT)
Dept: ANTEPARTUM | Facility: CLINIC | Age: 25
End: 2020-02-04
Payer: MEDICAID

## 2020-02-04 ENCOUNTER — RESULT CHARGE (OUTPATIENT)
Age: 25
End: 2020-02-04

## 2020-02-04 ENCOUNTER — ASOB RESULT (OUTPATIENT)
Age: 25
End: 2020-02-04

## 2020-02-04 ENCOUNTER — NON-APPOINTMENT (OUTPATIENT)
Age: 25
End: 2020-02-04

## 2020-02-04 VITALS — WEIGHT: 161.19 LBS | DIASTOLIC BLOOD PRESSURE: 70 MMHG | SYSTOLIC BLOOD PRESSURE: 120 MMHG

## 2020-02-04 PROCEDURE — 76816 OB US FOLLOW-UP PER FETUS: CPT | Mod: 26

## 2020-02-04 PROCEDURE — 99213 OFFICE O/P EST LOW 20 MIN: CPT

## 2020-02-05 DIAGNOSIS — Z3A.36 36 WEEKS GESTATION OF PREGNANCY: ICD-10-CM

## 2020-02-05 DIAGNOSIS — Z36.89 ENCOUNTER FOR OTHER SPECIFIED ANTENATAL SCREENING: ICD-10-CM

## 2020-02-06 DIAGNOSIS — Z34.90 ENCOUNTER FOR SUPERVISION OF NORMAL PREGNANCY, UNSPECIFIED, UNSPECIFIED TRIMESTER: ICD-10-CM

## 2020-02-06 DIAGNOSIS — Z34.03 ENCOUNTER FOR SUPERVISION OF NORMAL FIRST PREGNANCY, THIRD TRIMESTER: ICD-10-CM

## 2020-02-07 LAB
C TRACH RRNA SPEC QL NAA+PROBE: NOT DETECTED
GP B STREP DNA SPEC QL NAA+PROBE: NORMAL
GP B STREP DNA SPEC QL NAA+PROBE: NOT DETECTED
N GONORRHOEA RRNA SPEC QL NAA+PROBE: NOT DETECTED
SOURCE AMPLIFICATION: NORMAL
SOURCE GBS: NORMAL

## 2020-02-11 ENCOUNTER — APPOINTMENT (OUTPATIENT)
Dept: OBGYN | Facility: CLINIC | Age: 25
End: 2020-02-11
Payer: MEDICAID

## 2020-02-11 ENCOUNTER — RESULT CHARGE (OUTPATIENT)
Age: 25
End: 2020-02-11

## 2020-02-11 ENCOUNTER — OUTPATIENT (OUTPATIENT)
Dept: OUTPATIENT SERVICES | Facility: HOSPITAL | Age: 25
LOS: 1 days | Discharge: HOME | End: 2020-02-11

## 2020-02-11 VITALS
DIASTOLIC BLOOD PRESSURE: 68 MMHG | HEIGHT: 61 IN | SYSTOLIC BLOOD PRESSURE: 120 MMHG | WEIGHT: 165 LBS | BODY MASS INDEX: 31.15 KG/M2

## 2020-02-11 LAB
BILIRUB UR QL STRIP: NORMAL
CLARITY UR: CLEAR
COLLECTION METHOD: NORMAL
GLUCOSE UR-MCNC: NORMAL
HCG UR QL: 2 EU/DL
HGB UR QL STRIP.AUTO: NORMAL
KETONES UR-MCNC: NORMAL
LEUKOCYTE ESTERASE UR QL STRIP: NORMAL
NITRITE UR QL STRIP: NORMAL
PH UR STRIP: 7
PROT UR STRIP-MCNC: 100
SP GR UR STRIP: 1.01

## 2020-02-11 PROCEDURE — 99213 OFFICE O/P EST LOW 20 MIN: CPT

## 2020-02-12 ENCOUNTER — OUTPATIENT (OUTPATIENT)
Dept: OUTPATIENT SERVICES | Facility: HOSPITAL | Age: 25
LOS: 1 days | Discharge: HOME | End: 2020-02-12

## 2020-02-12 VITALS — SYSTOLIC BLOOD PRESSURE: 122 MMHG | DIASTOLIC BLOOD PRESSURE: 74 MMHG | HEART RATE: 81 BPM

## 2020-02-12 VITALS
RESPIRATION RATE: 18 BRPM | TEMPERATURE: 98 F | SYSTOLIC BLOOD PRESSURE: 125 MMHG | DIASTOLIC BLOOD PRESSURE: 87 MMHG | HEART RATE: 87 BPM

## 2020-02-12 NOTE — OB PROVIDER TRIAGE NOTE - NSOBPROVIDERNOTE_OBGYN_ALL_OB_FT
24y GP @ weeks, GBS, not in labor.  -Discharged Home  -Labor precautions reviewed  -PO Hydration encouraged  -Advised to count fetal kick counts  -Follow up with your scheduled OB visit 24y  @ 38 weeks, GBS negative, not in labor.    -Discharged Home  -Labor precautions reviewed  -PO Hydration encouraged  -Advised to count fetal kick counts  -Follow up with your scheduled OB visit

## 2020-02-12 NOTE — OB PROVIDER TRIAGE NOTE - NSHPPHYSICALEXAM_GEN_ALL_CORE
T(C): 36.9 (02-12-20 @ 09:19), Max: 36.9 (02-12-20 @ 09:16)  HR: 81 (02-12-20 @ 09:25) (81 - 87)  BP: 122/74 (02-12-20 @ 09:25) (122/74 - 125/87)  RR: 18 (02-12-20 @ 09:16) (18 - 18)    EFM: 140 bpm, moderate variability, +accels  TOCO: q 3-5 mins

## 2020-02-12 NOTE — OB PROVIDER TRIAGE NOTE - HISTORY OF PRESENT ILLNESS
24y  @ 38 weeks by first trimester sono, EDC 2020, present for decreased fetal movement. Patient states she last felt fetal movement about 0630. Currently feeling movement. Patient states she ate something sweet and did not feel movement. GBS negative. Denies VB, LOF, and ctx. +FM. No complications with this pregnancy. Last seen by Dr. Badillo in the office yesterday, exam was deferred.

## 2020-02-12 NOTE — OB PROVIDER TRIAGE NOTE - NSHPLABSRESULTS_GEN_ALL_CORE
Blood type:  GBS:  HBsAg:  Rubella:  RPR:  Gonorrhea:  Chlamydia:  Varicella:  HIV:  CF:  SMA:  Pap smear:     GCT:    GTT:    ANEUPLOIDY SCREENING:    SONOGRAMS: Blood type:  GBS: neg  HBsAg:  Rubella:  RPR:  Gonorrhea: neg  Chlamydia: neg  Varicella:  HIV:  CF:  SMA:  Pap smear:     GCT: 140    GTT: Fastin          1 hr: 150          2 hrs: 128          3 hrs: 115    ANEUPLOIDY SCREENING:    SONOGRAMS: Blood type: O+  GBS: neg  HBsAg: Nonreactive  Rubella: Immune  RPR: neg  Gonorrhea: neg  Chlamydia: neg  Varicella: non immune  HIV: neg  CF: neg  SMA: neg  Pap smear: LSIL    GCT: 140    GTT: Fastin          1 hr: 150          2 hrs: 128          3 hrs: 115    ANEUPLOIDY SCREENING: neg    SONOGRAMS:  2020: 36 weeks: EFW 2790 gms (35%), vtx, posterior placenta, DARWIN 5.05 cm, BPP 19: 12.5 weeks: NT 1.18 mm

## 2020-02-13 DIAGNOSIS — Z34.03 ENCOUNTER FOR SUPERVISION OF NORMAL FIRST PREGNANCY, THIRD TRIMESTER: ICD-10-CM

## 2020-02-18 ENCOUNTER — NON-APPOINTMENT (OUTPATIENT)
Age: 25
End: 2020-02-18

## 2020-02-18 ENCOUNTER — APPOINTMENT (OUTPATIENT)
Dept: OBGYN | Facility: CLINIC | Age: 25
End: 2020-02-18

## 2020-02-18 ENCOUNTER — RESULT CHARGE (OUTPATIENT)
Age: 25
End: 2020-02-18

## 2020-02-18 ENCOUNTER — OUTPATIENT (OUTPATIENT)
Dept: OUTPATIENT SERVICES | Facility: HOSPITAL | Age: 25
LOS: 1 days | Discharge: HOME | End: 2020-02-18

## 2020-02-18 VITALS
WEIGHT: 172.05 LBS | HEIGHT: 61 IN | DIASTOLIC BLOOD PRESSURE: 74 MMHG | SYSTOLIC BLOOD PRESSURE: 122 MMHG | BODY MASS INDEX: 32.48 KG/M2

## 2020-02-18 VITALS — SYSTOLIC BLOOD PRESSURE: 120 MMHG | WEIGHT: 166 LBS | DIASTOLIC BLOOD PRESSURE: 80 MMHG

## 2020-02-18 LAB
BILIRUB UR QL STRIP: NORMAL
CLARITY UR: CLEAR
COLLECTION METHOD: NORMAL
GLUCOSE UR-MCNC: NORMAL
HCG UR QL: 0.2 EU/DL
HGB UR QL STRIP.AUTO: NORMAL
KETONES UR-MCNC: NORMAL
LEUKOCYTE ESTERASE UR QL STRIP: NORMAL
NITRITE UR QL STRIP: NORMAL
PH UR STRIP: 6.5
PROT UR STRIP-MCNC: NORMAL
SP GR UR STRIP: 1.01

## 2020-02-21 DIAGNOSIS — Z34.90 ENCOUNTER FOR SUPERVISION OF NORMAL PREGNANCY, UNSPECIFIED, UNSPECIFIED TRIMESTER: ICD-10-CM

## 2020-02-24 LAB
ALBUMIN SERPL ELPH-MCNC: 3.1 G/DL
ALP BLD-CCNC: 454 U/L
ALT SERPL-CCNC: 8 U/L
ANION GAP SERPL CALC-SCNC: 15 MMOL/L
AST SERPL-CCNC: 13 U/L
BASOPHILS # BLD AUTO: 0.04 K/UL
BASOPHILS NFR BLD AUTO: 0.4 %
BILIRUB SERPL-MCNC: 0.3 MG/DL
BUN SERPL-MCNC: 13 MG/DL
CALCIUM SERPL-MCNC: 8.8 MG/DL
CHLORIDE SERPL-SCNC: 103 MMOL/L
CO2 SERPL-SCNC: 20 MMOL/L
CREAT 24H UR-MCNC: 1 G/24 HR
CREAT ?TM UR-MCNC: 60 MG/DL
CREAT SERPL-MCNC: 0.7 MG/DL
EOSINOPHIL # BLD AUTO: 0.22 K/UL
EOSINOPHIL NFR BLD AUTO: 2.2 %
GLUCOSE SERPL-MCNC: 68 MG/DL
HCT VFR BLD CALC: 34.3 %
HGB BLD-MCNC: 10.6 G/DL
HIV1+2 AB SPEC QL IA.RAPID: NONREACTIVE
IMM GRANULOCYTES NFR BLD AUTO: 0.8 %
LDH SERPL-CCNC: 195
LYMPHOCYTES # BLD AUTO: 2.7 K/UL
LYMPHOCYTES NFR BLD AUTO: 27.5 %
MAN DIFF?: NORMAL
MCHC RBC-ENTMCNC: 27.6 PG
MCHC RBC-ENTMCNC: 30.9 G/DL
MCV RBC AUTO: 89.3 FL
MONOCYTES # BLD AUTO: 0.55 K/UL
MONOCYTES NFR BLD AUTO: 5.6 %
NEUTROPHILS # BLD AUTO: 6.24 K/UL
NEUTROPHILS NFR BLD AUTO: 63.5 %
PLATELET # BLD AUTO: 183 K/UL
POTASSIUM SERPL-SCNC: 4.8 MMOL/L
PROT 24H UR-MRATE: 127 MG/DL
PROT ?TM UR-MCNC: 24 HR
PROT SERPL-MCNC: 6.2 G/DL
PROT UR-MCNC: 2191 MG/24 H
RBC # BLD: 3.84 M/UL
RBC # FLD: 14.2 %
SODIUM SERPL-SCNC: 138 MMOL/L
SPECIMEN VOL 24H UR: 1725 ML
T PALLIDUM AB SER QL IA: NEGATIVE
URATE SERPL-MCNC: 5.9 MG/DL
WBC # FLD AUTO: 9.83 K/UL

## 2020-02-25 ENCOUNTER — APPOINTMENT (OUTPATIENT)
Dept: OBGYN | Facility: CLINIC | Age: 25
End: 2020-02-25

## 2020-02-25 ENCOUNTER — INPATIENT (INPATIENT)
Facility: HOSPITAL | Age: 25
LOS: 0 days | Discharge: HOME | End: 2020-02-26
Attending: OBSTETRICS & GYNECOLOGY | Admitting: OBSTETRICS & GYNECOLOGY
Payer: MEDICAID

## 2020-02-25 VITALS — RESPIRATION RATE: 18 BRPM | HEART RATE: 69 BPM | TEMPERATURE: 99 F

## 2020-02-25 DIAGNOSIS — Z02.9 ENCOUNTER FOR ADMINISTRATIVE EXAMINATIONS, UNSPECIFIED: ICD-10-CM

## 2020-02-25 LAB
AMPHET UR-MCNC: NEGATIVE — SIGNIFICANT CHANGE UP
APPEARANCE UR: ABNORMAL
BACTERIA # UR AUTO: ABNORMAL
BARBITURATES UR SCN-MCNC: NEGATIVE — SIGNIFICANT CHANGE UP
BASOPHILS # BLD AUTO: 0.03 K/UL — SIGNIFICANT CHANGE UP (ref 0–0.2)
BASOPHILS NFR BLD AUTO: 0.2 % — SIGNIFICANT CHANGE UP (ref 0–1)
BENZODIAZ UR-MCNC: NEGATIVE — SIGNIFICANT CHANGE UP
BILIRUB UR-MCNC: NEGATIVE — SIGNIFICANT CHANGE UP
BLD GP AB SCN SERPL QL: SIGNIFICANT CHANGE UP
BUPRENORPHINE SCREEN, URINE RESULT: NEGATIVE — SIGNIFICANT CHANGE UP
COCAINE METAB.OTHER UR-MCNC: NEGATIVE — SIGNIFICANT CHANGE UP
COLOR SPEC: SIGNIFICANT CHANGE UP
DIFF PNL FLD: ABNORMAL
EOSINOPHIL # BLD AUTO: 0.15 K/UL — SIGNIFICANT CHANGE UP (ref 0–0.7)
EOSINOPHIL NFR BLD AUTO: 1.1 % — SIGNIFICANT CHANGE UP (ref 0–8)
EPI CELLS # UR: 5 /HPF — SIGNIFICANT CHANGE UP (ref 0–5)
FENTANYL UR QL: NEGATIVE — SIGNIFICANT CHANGE UP
GLUCOSE UR QL: NEGATIVE — SIGNIFICANT CHANGE UP
HCT VFR BLD CALC: 36.7 % — LOW (ref 37–47)
HGB BLD-MCNC: 11.6 G/DL — LOW (ref 12–16)
HYALINE CASTS # UR AUTO: 8 /LPF — HIGH (ref 0–7)
IMM GRANULOCYTES NFR BLD AUTO: 0.7 % — HIGH (ref 0.1–0.3)
KETONES UR-MCNC: NEGATIVE — SIGNIFICANT CHANGE UP
L&D DRUG SCREEN, URINE: SIGNIFICANT CHANGE UP
LEUKOCYTE ESTERASE UR-ACNC: ABNORMAL
LYMPHOCYTES # BLD AUTO: 17.2 % — LOW (ref 20.5–51.1)
LYMPHOCYTES # BLD AUTO: 2.31 K/UL — SIGNIFICANT CHANGE UP (ref 1.2–3.4)
MCHC RBC-ENTMCNC: 27.7 PG — SIGNIFICANT CHANGE UP (ref 27–31)
MCHC RBC-ENTMCNC: 31.6 G/DL — LOW (ref 32–37)
MCV RBC AUTO: 87.6 FL — SIGNIFICANT CHANGE UP (ref 81–99)
METHADONE UR-MCNC: NEGATIVE — SIGNIFICANT CHANGE UP
MONOCYTES # BLD AUTO: 0.74 K/UL — HIGH (ref 0.1–0.6)
MONOCYTES NFR BLD AUTO: 5.5 % — SIGNIFICANT CHANGE UP (ref 1.7–9.3)
NEUTROPHILS # BLD AUTO: 10.12 K/UL — HIGH (ref 1.4–6.5)
NEUTROPHILS NFR BLD AUTO: 75.3 % — HIGH (ref 42.2–75.2)
NITRITE UR-MCNC: NEGATIVE — SIGNIFICANT CHANGE UP
NRBC # BLD: 0 /100 WBCS — SIGNIFICANT CHANGE UP (ref 0–0)
OPIATES UR-MCNC: NEGATIVE — SIGNIFICANT CHANGE UP
OXYCODONE UR-MCNC: NEGATIVE — SIGNIFICANT CHANGE UP
PCP UR-MCNC: NEGATIVE — SIGNIFICANT CHANGE UP
PH UR: 6.5 — SIGNIFICANT CHANGE UP (ref 5–8)
PLATELET # BLD AUTO: 205 K/UL — SIGNIFICANT CHANGE UP (ref 130–400)
PRENATAL SYPHILIS TEST: SIGNIFICANT CHANGE UP
PROPOXYPHENE QUALITATIVE URINE RESULT: NEGATIVE — SIGNIFICANT CHANGE UP
PROT UR-MCNC: ABNORMAL
RBC # BLD: 4.19 M/UL — LOW (ref 4.2–5.4)
RBC # FLD: 14.5 % — SIGNIFICANT CHANGE UP (ref 11.5–14.5)
RBC CASTS # UR COMP ASSIST: 7 /HPF — HIGH (ref 0–4)
SP GR SPEC: 1.02 — SIGNIFICANT CHANGE UP (ref 1.01–1.02)
UROBILINOGEN FLD QL: SIGNIFICANT CHANGE UP
WBC # BLD: 13.44 K/UL — HIGH (ref 4.8–10.8)
WBC # FLD AUTO: 13.44 K/UL — HIGH (ref 4.8–10.8)
WBC UR QL: 52 /HPF — HIGH (ref 0–5)

## 2020-02-25 PROCEDURE — L9996: CPT

## 2020-02-25 PROCEDURE — 59409 OBSTETRICAL CARE: CPT | Mod: U9

## 2020-02-25 RX ORDER — HYDROCORTISONE 1 %
1 OINTMENT (GRAM) TOPICAL EVERY 6 HOURS
Refills: 0 | Status: DISCONTINUED | OUTPATIENT
Start: 2020-02-25 | End: 2020-02-26

## 2020-02-25 RX ORDER — PRAMOXINE HYDROCHLORIDE 150 MG/15G
1 AEROSOL, FOAM RECTAL EVERY 4 HOURS
Refills: 0 | Status: DISCONTINUED | OUTPATIENT
Start: 2020-02-25 | End: 2020-02-26

## 2020-02-25 RX ORDER — DIPHENHYDRAMINE HCL 50 MG
25 CAPSULE ORAL EVERY 6 HOURS
Refills: 0 | Status: DISCONTINUED | OUTPATIENT
Start: 2020-02-25 | End: 2020-02-26

## 2020-02-25 RX ORDER — SODIUM CHLORIDE 9 MG/ML
1000 INJECTION, SOLUTION INTRAVENOUS
Refills: 0 | Status: DISCONTINUED | OUTPATIENT
Start: 2020-02-25 | End: 2020-02-25

## 2020-02-25 RX ORDER — GLYCERIN ADULT
1 SUPPOSITORY, RECTAL RECTAL AT BEDTIME
Refills: 0 | Status: DISCONTINUED | OUTPATIENT
Start: 2020-02-25 | End: 2020-02-26

## 2020-02-25 RX ORDER — DIBUCAINE 1 %
1 OINTMENT (GRAM) RECTAL EVERY 6 HOURS
Refills: 0 | Status: DISCONTINUED | OUTPATIENT
Start: 2020-02-25 | End: 2020-02-26

## 2020-02-25 RX ORDER — IBUPROFEN 200 MG
600 TABLET ORAL EVERY 6 HOURS
Refills: 0 | Status: COMPLETED | OUTPATIENT
Start: 2020-02-25 | End: 2021-01-23

## 2020-02-25 RX ORDER — MAGNESIUM HYDROXIDE 400 MG/1
30 TABLET, CHEWABLE ORAL
Refills: 0 | Status: DISCONTINUED | OUTPATIENT
Start: 2020-02-25 | End: 2020-02-26

## 2020-02-25 RX ORDER — OXYCODONE HYDROCHLORIDE 5 MG/1
5 TABLET ORAL
Refills: 0 | Status: DISCONTINUED | OUTPATIENT
Start: 2020-02-25 | End: 2020-02-26

## 2020-02-25 RX ORDER — TETANUS TOXOID, REDUCED DIPHTHERIA TOXOID AND ACELLULAR PERTUSSIS VACCINE, ADSORBED 5; 2.5; 8; 8; 2.5 [IU]/.5ML; [IU]/.5ML; UG/.5ML; UG/.5ML; UG/.5ML
0.5 SUSPENSION INTRAMUSCULAR ONCE
Refills: 0 | Status: DISCONTINUED | OUTPATIENT
Start: 2020-02-25 | End: 2020-02-26

## 2020-02-25 RX ORDER — LANOLIN
1 OINTMENT (GRAM) TOPICAL EVERY 6 HOURS
Refills: 0 | Status: DISCONTINUED | OUTPATIENT
Start: 2020-02-25 | End: 2020-02-26

## 2020-02-25 RX ORDER — OXYCODONE HYDROCHLORIDE 5 MG/1
5 TABLET ORAL ONCE
Refills: 0 | Status: DISCONTINUED | OUTPATIENT
Start: 2020-02-25 | End: 2020-02-26

## 2020-02-25 RX ORDER — OXYTOCIN 10 UNIT/ML
2 VIAL (ML) INJECTION
Qty: 30 | Refills: 0 | Status: DISCONTINUED | OUTPATIENT
Start: 2020-02-25 | End: 2020-02-25

## 2020-02-25 RX ORDER — ACETAMINOPHEN 500 MG
975 TABLET ORAL
Refills: 0 | Status: DISCONTINUED | OUTPATIENT
Start: 2020-02-25 | End: 2020-02-26

## 2020-02-25 RX ORDER — SIMETHICONE 80 MG/1
80 TABLET, CHEWABLE ORAL EVERY 4 HOURS
Refills: 0 | Status: DISCONTINUED | OUTPATIENT
Start: 2020-02-25 | End: 2020-02-26

## 2020-02-25 RX ORDER — OXYTOCIN 10 UNIT/ML
333.33 VIAL (ML) INJECTION
Qty: 20 | Refills: 0 | Status: DISCONTINUED | OUTPATIENT
Start: 2020-02-25 | End: 2020-02-26

## 2020-02-25 RX ORDER — OXYTOCIN 10 UNIT/ML
333.33 VIAL (ML) INJECTION
Qty: 20 | Refills: 0 | Status: COMPLETED | OUTPATIENT
Start: 2020-02-25 | End: 2020-02-25

## 2020-02-25 RX ORDER — IBUPROFEN 200 MG
600 TABLET ORAL EVERY 6 HOURS
Refills: 0 | Status: DISCONTINUED | OUTPATIENT
Start: 2020-02-25 | End: 2020-02-26

## 2020-02-25 RX ORDER — BENZOCAINE 10 %
1 GEL (GRAM) MUCOUS MEMBRANE EVERY 6 HOURS
Refills: 0 | Status: DISCONTINUED | OUTPATIENT
Start: 2020-02-25 | End: 2020-02-26

## 2020-02-25 RX ORDER — SODIUM CHLORIDE 9 MG/ML
3 INJECTION INTRAMUSCULAR; INTRAVENOUS; SUBCUTANEOUS EVERY 8 HOURS
Refills: 0 | Status: DISCONTINUED | OUTPATIENT
Start: 2020-02-25 | End: 2020-02-26

## 2020-02-25 RX ORDER — AER TRAVELER 0.5 G/1
1 SOLUTION RECTAL; TOPICAL EVERY 4 HOURS
Refills: 0 | Status: DISCONTINUED | OUTPATIENT
Start: 2020-02-25 | End: 2020-02-26

## 2020-02-25 RX ORDER — KETOROLAC TROMETHAMINE 30 MG/ML
30 SYRINGE (ML) INJECTION ONCE
Refills: 0 | Status: DISCONTINUED | OUTPATIENT
Start: 2020-02-25 | End: 2020-02-25

## 2020-02-25 RX ADMIN — Medication 1000 MILLIUNIT(S)/MIN: at 15:22

## 2020-02-25 RX ADMIN — Medication 975 MILLIGRAM(S): at 21:55

## 2020-02-25 RX ADMIN — SODIUM CHLORIDE 125 MILLILITER(S): 9 INJECTION, SOLUTION INTRAVENOUS at 12:13

## 2020-02-25 RX ADMIN — Medication 30 MILLIGRAM(S): at 17:17

## 2020-02-25 NOTE — OB PROVIDER H&P - ATTENDING COMMENTS
P0 39w4d in early labor, variable decelerations  - Admit  - Continuous monitoring  - Pain management PRN  - Reevaluate in 2-4hours, consider AROM/pitocin for augmentation

## 2020-02-25 NOTE — OB PROVIDER H&P - ASSESSMENT
25 yo  at 39w4d, GBS negative, h/o proteinuria, in labor  - Admit to L&D  - Continuous EFM and toco  - IV fluids  - Admission labs  - Clear liquid diet  - Pain management PRN    Discussed with Dr. Stout 25 yo  at 39w4d, GBS negative, elevated GCT w/ nml GTT, measles and varicella non-immune, h/o proteinuria, in labor.    - Admit to L&D  - Continuous EFM and toco  - IV fluids  - Admission labs  - Clear liquid diet  - Pain management PRN  - needs MMR PP    Discussed with Dr. Stout

## 2020-02-25 NOTE — PROGRESS NOTE ADULT - SUBJECTIVE AND OBJECTIVE BOX
PGY 1 Note    Patient seen at bedside for evaluation of labor progression/decels.  Reports painful/no ctx. Comfortable s/p epidural.    Vital Signs Last 24 Hrs  T(C): 37.1 (2020 07:09), Max: 37.1 (2020 02:20)  T(F): 98.78 (2020 07:09), Max: 98.8 (2020 02:20)  HR: 85 (2020 09:37) (63 - 109)  BP: 102/68 (2020 09:31) (90/52 - 133/88)  RR: 16 (2020 07:09) (16 - 18)  SpO2: 100% (2020 09:42) (89% - 100%)    EFM:   TOCO:  SVE:     Labs:                        11.6   13.44 )-----------( 205      ( 2020 02:56 )             36.7           ABO RH Interpretation: O POS (20 @ 02:56)    Urinalysis Basic - ( 2020 02:56 )    Color: Light Yellow / Appearance: Slightly Turbid / S.021 / pH: x  Gluc: x / Ketone: Negative  / Bili: Negative / Urobili: <2 mg/dL   Blood: x / Protein: 300 mg/dL / Nitrite: Negative   Leuk Esterase: Moderate / RBC: 7 /HPF / WBC 52 /HPF   Sq Epi: x / Non Sq Epi: 5 /HPF / Bacteria: Few          Meds: lactated ringers. 1000 milliLiter(s) IV Continuous <Continuous>  oxytocin Infusion 333.333 milliUNIT(s)/Min IV Continuous <Continuous>  oxytocin Infusion 2 milliUNIT(s)/Min IV Continuous <Continuous>      A/P:  24y Gestational Age  , in labor  -cont EFM/toco  -clear liquid diet, IVF  -pain management prn/with epidural  -f/u    Dr. rodriguez. PGY 1 Note    Patient seen at bedside for evaluation of labor progression.  Feels pressure with contractions, recently received epidural top off 20 min ago.  With intermittent variable decelerations, down to 70bpm, lasting 1-2m, resolving spontaneously. Resuscitated with repositioning, O2 and IVF bolus.  Denies any other complaints.    Vital Signs Last 24 Hrs  T(C): 37.1 (2020 07:09), Max: 37.1 (2020 02:20)  T(F): 98.78 (2020 07:09), Max: 98.8 (2020 02:20)  HR: 85 (2020 09:37) (63 - 109)  BP: 102/68 (2020 09:31) (90/52 - 133/88)  RR: 16 (2020 07:09) (16 - 18)  SpO2: 100% (2020 09:42) (89% - 100%)    EFM: 140/mod/+accel/+intermittent variable decelerations, cat II  TOCO: q2-4m  SVE: 6/80/-2 @0853    Labs:                        11.6   13.44 )-----------( 205      ( 2020 02:56 )             36.7           ABO RH Interpretation: O POS (20 @ 02:56)  antibody neg  RPR NR    Urinalysis Basic - ( 2020 02:56 )    Color: Light Yellow / Appearance: Slightly Turbid / S.021 / pH: x  Gluc: x / Ketone: Negative  / Bili: Negative / Urobili: <2 mg/dL   Blood: x / Protein: 300 mg/dL / Nitrite: Negative   Leuk Esterase: Moderate / RBC: 7 /HPF / WBC 52 /HPF   Sq Epi: x / Non Sq Epi: 5 /HPF / Bacteria: Few          Meds: No

## 2020-02-25 NOTE — PROGRESS NOTE ADULT - SUBJECTIVE AND OBJECTIVE BOX
Pt evaluated at bedside c/o pain.    T(C): 37.0 (20 @ 11:42), Max: 37.1 (20 @ 02:20)  HR: 104 (20 @ 12:27) (63 - 109)  BP: 110/65 (20 @ 12:18) (90/52 - 133/88)  RR: 16 (20 @ 07:09) (16 - 18)  SpO2: 100% (20 @ 12:22) (89% - 100%)    VE: 8/80/-1  AROM, [place]  Ctx: q 2-4 min  FHR: 145 moderate variability, Cat I    A/P: 24y.o.  @ 39.4wks in labor  - Continuous efm and toco  - pain management PRN  - Dr. Mir aware/ Will inform Dr. Rosen

## 2020-02-25 NOTE — PROCEDURE NOTE - ADDITIONAL PROCEDURE DETAILS
REDOSE  Pain: 8/10  V/E 4cm  Time: 71192  Medication: Ropivacaine 0.25% 10cc  Given in increments after aspiration  VSS analgesia at T10 REDOSE  Pain: 8/10  V/E 4cm  Time: 0905  Medication: Ropivacaine 0.25% 10cc  Given in increments after aspiration  VSS analgesia at T10    REDOSE  Pain: 7/10  V/E 8cm  Time: 1230  Medication: 2% Chloroprocaine 5cc  Given in increments after aspiration  VSS analgesia at T10 REDOSE  Pain: 8/10  V/E 4cm  Time: 0905  Medication: Ropivacaine 0.25% 10cc  Given in increments after aspiration  VSS analgesia at T10    REDOSE  Pain: 7/10  V/E 8cm  Time: 1230  Medication: 2% Chloroprocaine 5cc  Given in increments after aspiration  VSS analgesia at T10    REDOSE  Pain: 9/10 pressure  V/E ant lip  Time: 1400  Medication: 2% chloroprocaine 8cc  Given in increments after aspiration  VSS analgesia at T10

## 2020-02-25 NOTE — PROGRESS NOTE ADULT - SUBJECTIVE AND OBJECTIVE BOX
PGY 2 Note    S: Pt seen and examined at bedside for labor check. Doing well, experiencing pain with contractions.     Vital Signs Last 24 Hrs  T(F): 98.6 (2020 02:55), Max: 98.8 (2020 02:20)  HR: 80 (2020 05:22) (69 - 90)  BP: 124/76 (2020 05:22) (118/83 - 133/88)  RR: 18 (2020 02:55) (18 - 18)    EFM: 140/mod rainer/+accel  TOCO: q2-4min  SVE: 5/90/-1, vtx, intact    Labs:                        11.6   13.44 )-----------( 205      ( 2020 02:56 )             36.7             ABO RH Interpretation: O POS (20 @ 02:56)    Urinalysis Basic - ( 2020 02:56 )    Color: Light Yellow / Appearance: Slightly Turbid / S.021 / pH: x  Gluc: x / Ketone: Negative  / Bili: Negative / Urobili: <2 mg/dL   Blood: x / Protein: 300 mg/dL / Nitrite: Negative   Leuk Esterase: Moderate / RBC: 7 /HPF / WBC 52 /HPF   Sq Epi: x / Non Sq Epi: 5 /HPF / Bacteria: Few        Prenatal Syphilis Test: Nonreact (20 @ 02:56)      Meds:  none

## 2020-02-25 NOTE — PROGRESS NOTE ADULT - SUBJECTIVE AND OBJECTIVE BOX
PGY1 Note    Patient seen and examined at bedside after a late decel s/p epidural placement. FHR decel lasted approximately 2 minutes down to the 60's and responded to maternal repositioning, oxygenation, and fluid bolus. FHR returned to baseline of 130bpms with moderate variability     T(F): 98.6 ( @ 02:55), Max: 98.8 ( @ 02:20)  HR: 75 ( @ 06:31)  BP: 103/63 ( @ 06:31) (90/52 - 133/88)  RR: 18 ( @ 02:55)    EFM: 130bpm/moderate variability/+accels   TOCO: q2-4mins  SVE: 6/90/-1, vtx, ruptured          Labs:                        11.6   13.44 )-----------( 205      ( 2020 02:56 )             36.7           Prenatal Syphilis Test: Nonreact ( @ 02:56)  Antibody Screen: NEG (20 @ 02:56)    Urinalysis Basic - ( 2020 02:56 )    Color: Light Yellow / Appearance: Slightly Turbid / S.021 / pH: x  Gluc: x / Ketone: Negative  / Bili: Negative / Urobili: <2 mg/dL   Blood: x / Protein: 300 mg/dL / Nitrite: Negative   Leuk Esterase: Moderate / RBC: 7 /HPF / WBC 52 /HPF   Sq Epi: x / Non Sq Epi: 5 /HPF / Bacteria: Few

## 2020-02-25 NOTE — PROGRESS NOTE ADULT - SUBJECTIVE AND OBJECTIVE BOX
Pt evaluated at bedside, c/o pressure    T(C): 37.0 (20 @ 11:42), Max: 37.1 (20 @ 02:20)  HR: 122 (20 @ 13:52) (63 - 122)  BP: 121/85 (20 @ 13:47) (90/52 - 133/88)  RR: 16 (20 @ 07:09) (16 - 18)  SpO2: 100% (20 @ 13:52) (89% - 100%)    VE: 9.5/ 100/ 0  Ctx: q 2 min  FHR: 145 moderate variability, Cat I    A/P: 24y.o.  @ 39.4wks in active labor, GBS negative  - COntinuous efm and toco  - Dr. Rosen aware

## 2020-02-25 NOTE — OB PROVIDER H&P - NSHPLABSRESULTS_GEN_ALL_CORE
2/20 24 hour urine protein 2191mg/24 hour Labs:  7/22/19  measles: non-immune    12/9/19  GCT: 140    1/4/20  GTT: 72/150/128/115    2/20 24 hour urine protein 2191mg/24 hour    Sonos:  12w5d: SIUP, +FHR, nt 1.18mm wnl  19w0d: EFW 295gms  36w4d: EFW 2790gms (35%), vtx, post placenta, MVP 5.05cm, BPP 8/8

## 2020-02-25 NOTE — OB PROVIDER H&P - HISTORY OF PRESENT ILLNESS
23 yo  at 39w4d GA by first trimester sono presents for contractions since 1700, every 2-3 min, strong intensity. Denies leakage of fluid or vaginal bleeding, she feels regular fetal movement. She denies issues this pregnancy.    She had isolated proteinuria in clinic last visit, was sent for PIH labs. 24 hour urine approx 2200g, never met BP criteria.

## 2020-02-25 NOTE — OB PROVIDER DELIVERY SUMMARY - NSPROVIDERDELIVERYNOTE_OBGYN_ALL_OB_FT
Patient was fully dilated and pushed. Fetal head delivered OA, and restituted to LOT. The anterior and  posterior shoulders delivered, followed by the remaining body atraumatically. Delayed cord clamping was performed for 1 minute, and then was clamped and cut. Cord blood & gases collected. The  was handed to mother for skin to skin. The placenta delivered spontaneously intact with 3v cord. Uterus massaged and firm with oxytocin given IV, patient stable. Cervix, vagina and perineum inspected. Repair of left labial laceration repaired in the usual fashion with 3-0 chromic and first degree perineal laceration repaired with 2-0 chromic.  live male delivered, weighing 3180g, APGARS 9/9.  EBL -300, hemostasis assured, uterus firm, patient in stable condition.

## 2020-02-25 NOTE — OB RN DELIVERY SUMMARY - NS_SEPSISRSKCALC_OBGYN_ALL_OB_FT
EOS calculated successfully. EOS Risk Factor: 0.5/1000 live births ([facility] national incidence); GA=39w4d; Temp=98.8; ROM=7.783; GBS='Negative'; Antibiotics='No antibiotics or any antibiotics < 2 hrs prior to birth'

## 2020-02-25 NOTE — OB PROVIDER H&P - NSHPPHYSICALEXAM_GEN_ALL_CORE
Vital Signs Last 24 Hrs  T(F): 98.8 (25 Feb 2020 02:27), Max: 98.8 (25 Feb 2020 02:20)  HR: 69 (25 Feb 2020 02:27) (69 - 69)  BP: 133/88 (25 Feb 2020 02:27) (133/88 - 133/88)  RR: 18 (25 Feb 2020 02:27) (18 - 18)    EFM: 140/moderate variability, variable decelerations  [place]: q1-2min    Gen: AAOx3, NAD  Abd: Gravid, soft, nontender, strong palpable contractions

## 2020-02-25 NOTE — PROGRESS NOTE ADULT - SUBJECTIVE AND OBJECTIVE BOX
Pt evaluated at bedside, comfortable with epidural.  	    T(C): 37.1 (20 @ 07:09), Max: 37.1 (20 @ 02:20)  HR: 97 (20 @ 10:42) (63 - 109)  BP: 120/81 (20 @ 10:18) (90/52 - 133/88)  RR: 16 (20 @ 07:09) (16 - 18)  SpO2: 99% (20 @ 10:37) (89% - 100%)    VE: 7/90/-2  Ctx: q 2-4 min  FHR: 150 moderate variability, +variable decels, Cat II    A/P: 21y.o.  @ 39.4wks in labor, Cat II tracing  - Continue O2   - IV hydration  - Continuous efm and toco  - Dr. faustin/ Dr. Rosen aware

## 2020-02-26 ENCOUNTER — TRANSCRIPTION ENCOUNTER (OUTPATIENT)
Age: 25
End: 2020-02-26

## 2020-02-26 VITALS
RESPIRATION RATE: 18 BRPM | DIASTOLIC BLOOD PRESSURE: 60 MMHG | SYSTOLIC BLOOD PRESSURE: 130 MMHG | HEART RATE: 85 BPM | TEMPERATURE: 97 F

## 2020-02-26 LAB
BASOPHILS # BLD AUTO: 0.03 K/UL — SIGNIFICANT CHANGE UP (ref 0–0.2)
BASOPHILS NFR BLD AUTO: 0.2 % — SIGNIFICANT CHANGE UP (ref 0–1)
EOSINOPHIL # BLD AUTO: 0.11 K/UL — SIGNIFICANT CHANGE UP (ref 0–0.7)
EOSINOPHIL NFR BLD AUTO: 0.7 % — SIGNIFICANT CHANGE UP (ref 0–8)
HCT VFR BLD CALC: 27.1 % — LOW (ref 37–47)
HGB BLD-MCNC: 8.6 G/DL — LOW (ref 12–16)
IMM GRANULOCYTES NFR BLD AUTO: 0.5 % — HIGH (ref 0.1–0.3)
LYMPHOCYTES # BLD AUTO: 15.1 % — LOW (ref 20.5–51.1)
LYMPHOCYTES # BLD AUTO: 2.5 K/UL — SIGNIFICANT CHANGE UP (ref 1.2–3.4)
MCHC RBC-ENTMCNC: 28.5 PG — SIGNIFICANT CHANGE UP (ref 27–31)
MCHC RBC-ENTMCNC: 31.7 G/DL — LOW (ref 32–37)
MCV RBC AUTO: 89.7 FL — SIGNIFICANT CHANGE UP (ref 81–99)
MONOCYTES # BLD AUTO: 0.75 K/UL — HIGH (ref 0.1–0.6)
MONOCYTES NFR BLD AUTO: 4.5 % — SIGNIFICANT CHANGE UP (ref 1.7–9.3)
NEUTROPHILS # BLD AUTO: 13.04 K/UL — HIGH (ref 1.4–6.5)
NEUTROPHILS NFR BLD AUTO: 79 % — HIGH (ref 42.2–75.2)
NRBC # BLD: 0 /100 WBCS — SIGNIFICANT CHANGE UP (ref 0–0)
PLATELET # BLD AUTO: 170 K/UL — SIGNIFICANT CHANGE UP (ref 130–400)
RBC # BLD: 3.02 M/UL — LOW (ref 4.2–5.4)
RBC # FLD: 15 % — HIGH (ref 11.5–14.5)
WBC # BLD: 16.52 K/UL — HIGH (ref 4.8–10.8)
WBC # FLD AUTO: 16.52 K/UL — HIGH (ref 4.8–10.8)

## 2020-02-26 RX ORDER — SIMETHICONE 80 MG/1
1 TABLET, CHEWABLE ORAL
Qty: 0 | Refills: 0 | DISCHARGE
Start: 2020-02-26

## 2020-02-26 RX ORDER — ACETAMINOPHEN 500 MG
3 TABLET ORAL
Qty: 0 | Refills: 0 | DISCHARGE
Start: 2020-02-26

## 2020-02-26 RX ORDER — IBUPROFEN 200 MG
1 TABLET ORAL
Qty: 0 | Refills: 0 | DISCHARGE
Start: 2020-02-26

## 2020-02-26 RX ADMIN — Medication 975 MILLIGRAM(S): at 00:00

## 2020-02-26 RX ADMIN — Medication 975 MILLIGRAM(S): at 08:20

## 2020-02-26 RX ADMIN — Medication 600 MILLIGRAM(S): at 06:26

## 2020-02-26 RX ADMIN — SODIUM CHLORIDE 3 MILLILITER(S): 9 INJECTION INTRAMUSCULAR; INTRAVENOUS; SUBCUTANEOUS at 06:26

## 2020-02-26 NOTE — DISCHARGE NOTE OB - MEDICATION SUMMARY - MEDICATIONS TO TAKE
I will START or STAY ON the medications listed below when I get home from the hospital:    acetaminophen 325 mg oral tablet  -- 3 tab(s) by mouth every 6 hours, As Needed  -- Indication: For pain    ibuprofen 600 mg oral tablet  -- 1 tab(s) by mouth every 6 hours, As Needed  -- Indication: For pain    Prenatal Multivitamins with Folic Acid 1 mg oral tablet  -- 1 tab(s) by mouth once a day  -- Indication: For postpartum    simethicone 80 mg oral tablet, chewable  -- 1 tab(s) by mouth every 4 hours, As needed, Gas  -- Indication: For gas

## 2020-02-26 NOTE — DISCHARGE NOTE OB - PATIENT PORTAL LINK FT
You can access the FollowMyHealth Patient Portal offered by [facility] by registering at the following website: [email or web address]. By joining [facility]’s FollowMyHealth portal, you will also be able to view your health information using other applications (apps) compatible with our system.

## 2020-02-26 NOTE — DISCHARGE NOTE OB - CARE PROVIDER_API CALL
Iker Rene)  Obstetrics and Gynecology  [address] [place] [address]  Phone: [phone]  Fax: [phone]  Follow Up Time:     Laurie Duarte)  Internal Medicine; Nephrology  [address] [place] [address]  Phone: [phone]  Fax: [phone]  Follow Up Time:

## 2020-02-26 NOTE — DISCHARGE NOTE OB - ADDITIONAL INSTRUCTIONS
Nothing in the vagina for 6 weeks (no sex, no tampons, no douching). Avoid tub baths, you may shower.  If you have a fever of 100.4F or greater, severe vaginal bleeding, or severe abdominal pain, call your Ob/Gyn or come to the emergency department immediately.  Please follow up with your provider in 6 weeks for postpartum visit.  Please follow up with a nephrologist outpatient to evaluate your proteinuria, Dr. Laurie Arrington is a [facility] nephrologist that can be reached at [phone].

## 2020-02-26 NOTE — PROGRESS NOTE ADULT - SUBJECTIVE AND OBJECTIVE BOX
PGY 1 Post Partum note    Subjective: Patient seen and examined at bedside.  Denies any complaints.  Ambulating, tolerating PO, voiding, + flatus.  Pain well controlled. Desires discharge home today.       Physical exam:    Vital Signs Last 24 Hrs  T(C): 36.9 (25 Feb 2020 23:37), Max: 37.8 (25 Feb 2020 17:48)  T(F): 98.5 (25 Feb 2020 23:37), Max: 100 (25 Feb 2020 17:48)  HR: 99 (25 Feb 2020 23:37) (61 - 133)  BP: 109/63 (25 Feb 2020 23:37) (97/69 - 137/74)  RR: 18 (25 Feb 2020 23:37) (15 - 18)  SpO2: 92% (25 Feb 2020 14:55) (89% - 100%)    Gen: NAD  CVS: RRR, no m/r/g  Lungs: CTAB, no w/r/r  Abdomen: nondistended, soft, nontender, firm uterine fundus at the level of the umbilicus  Pelvic: Minimal rubra  Ext: No calf tenderness, no LE swelling      Meds:   acetaminophen   Tablet ..   975 milliGRAM(s) Oral (02-25-20 @ 21:55)    ibuprofen  Tablet.   600 milliGRAM(s) Oral (02-26-20 @ 06:26)    ketorolac   Injectable   30 milliGRAM(s) IV Push (02-25-20 @ 17:17)      MEDICATIONS  (STANDING):  acetaminophen   Tablet .. 975 milliGRAM(s) Oral <User Schedule>  ibuprofen  Tablet. 600 milliGRAM(s) Oral every 6 hours  measles/mumps/rubella Vaccine 0.5 milliLiter(s) SubCutaneous once  prenatal multivitamin 1 Tablet(s) Oral daily    MEDICATIONS  (PRN):  benzocaine 20%/menthol 0.5% Spray 1 Spray(s) Topical every 6 hours PRN for Perineal discomfort  dibucaine 1% Ointment 1 Application(s) Topical every 6 hours PRN Perineal discomfort  diphenhydrAMINE 25 milliGRAM(s) Oral every 6 hours PRN Pruritus  glycerin Suppository - Adult 1 Suppository(s) Rectal at bedtime PRN Constipation  hydrocortisone 1% Cream 1 Application(s) Topical every 6 hours PRN Moderate Pain (4-6)  lanolin Ointment 1 Application(s) Topical every 6 hours PRN nipple soreness  magnesium hydroxide Suspension 30 milliLiter(s) Oral two times a day PRN Constipation  oxyCODONE    IR 5 milliGRAM(s) Oral every 3 hours PRN Moderate to Severe Pain (4-10)  oxyCODONE    IR 5 milliGRAM(s) Oral once PRN Moderate to Severe Pain (4-10)  pramoxine 1%/zinc 5% Cream 1 Application(s) Topical every 4 hours PRN Moderate Pain (4-6)  simethicone 80 milliGRAM(s) Chew every 4 hours PRN Gas  witch hazel Pads 1 Application(s) Topical every 4 hours PRN Perineal discomfort      Diet: regular    LABS:                        11.6   13.44 )-----------( 205      ( 25 Feb 2020 02:56 )             36.7     UDS neg  O pos, antibody neg  RPR NR    Allergies    No Known Allergies

## 2020-02-26 NOTE — PROGRESS NOTE ADULT - ASSESSMENT
23 yo  at 39w4d, GBS neg, elevated GCT w/ nml GTT, measles and varicella non-immune, h/o proteinuria, s/p epidural, in labor progressing well   - Continuous EFM and toco  - IVF hydration  - diet: CLD  - monitor vitals  - needs MMR PP  - f/u UDS    Dr. Davila and Dr. Stout aware.
25 yo  at 39w4d, GBS neg, elevated GCT w/ nml GTT, measles and varicella non-immune, h/o proteinuria, in labor.    - Continuous EFM and toco  - Clear liquid diet, IV hydration  - monitor vitals  - epidural planned  - needs MMR PP  - will AROM after epi  - f/up UDS    Dr. Davila and Dr. Stout aware.
A/P:  23yo P1 s/p , PPD#1, measles and varicella non-immune, h/o proteinuria, recovering well  -encourage ambulation  -regular diet  -encourage PO hydration  -pain management prn   -f/u AM CBC  -discharge home today pending AM CBC  -postpartum precautions  -to follow up with nephro outpatient  -f/u with provider in 6 weeks for postpartum visit    Dr. Howard and attending to be made aware.
A/P:  23yo  at 39w4d, GBS neg, elevated GCT w/ nml GTT, measles and varicella non-immune, h/o proteinuria, s/p epidural, in labor with cat II tracing, progressing well   -cont EFM/toco  -clear liquid diet, IVF  -pain management with epidural  -MMR postpartum  -continue to resuscitate prn  -f/u UDS    Dr. Tomas and Dr. Rosen aware.

## 2020-02-26 NOTE — DISCHARGE NOTE OB - CARE PROVIDERS DIRECT ADDRESSES
[email or web address]

## 2020-02-26 NOTE — DISCHARGE NOTE OB - PLAN OF CARE
healthy recovery Monitor vitals/labs.  To follow up with provider in 6 weeks for postpartum visit. asymptomatic f/u with nephrology outpatient

## 2020-02-26 NOTE — DISCHARGE NOTE OB - CARE PLAN
Principal Discharge DX:	Vaginal delivery  Goal:	healthy recovery  Assessment and plan of treatment:	Monitor vitals/labs.  To follow up with provider in 6 weeks for postpartum visit.  Secondary Diagnosis:	Proteinuria  Goal:	asymptomatic  Assessment and plan of treatment:	f/u with nephrology outpatient

## 2020-02-26 NOTE — DISCHARGE NOTE OB - HOSPITAL COURSE
20 @ 06:54    HPI:  25 yo  at 39w4d GA by first trimester oscar presents for contractions since 1700, every 2-3 min, strong intensity. Denies leakage of fluid or vaginal bleeding, she feels regular fetal movement. She denies issues this pregnancy.    She had isolated proteinuria in clinic last visit, was sent for PIH labs. 24 hour urine approx 2200g, never met BP criteria. (2020 02:46)      PAST MEDICAL & SURGICAL HISTORY:  No pertinent past medical history  No significant past surgical history      POST PARTUM COURSE: Uncomplicated postpartum course, to be discharged on PPD#1 in stable condition.         LABS:                        11.6   13.44 )-----------( 205      ( 2020 02:56 )             36.7                   Allergies    No Known Allergies    Intolerances

## 2020-03-02 DIAGNOSIS — Z3A.39 39 WEEKS GESTATION OF PREGNANCY: ICD-10-CM

## 2020-03-03 ENCOUNTER — APPOINTMENT (OUTPATIENT)
Dept: OBGYN | Facility: CLINIC | Age: 25
End: 2020-03-03

## 2020-04-06 ENCOUNTER — APPOINTMENT (OUTPATIENT)
Dept: OBGYN | Facility: CLINIC | Age: 25
End: 2020-04-06

## 2020-06-01 ENCOUNTER — RESULT CHARGE (OUTPATIENT)
Age: 25
End: 2020-06-01

## 2020-06-01 ENCOUNTER — APPOINTMENT (OUTPATIENT)
Dept: OBGYN | Facility: CLINIC | Age: 25
End: 2020-06-01
Payer: MEDICAID

## 2020-06-01 ENCOUNTER — OUTPATIENT (OUTPATIENT)
Dept: OUTPATIENT SERVICES | Facility: HOSPITAL | Age: 25
LOS: 1 days | Discharge: HOME | End: 2020-06-01

## 2020-06-01 VITALS
BODY MASS INDEX: 27.05 KG/M2 | HEIGHT: 61 IN | DIASTOLIC BLOOD PRESSURE: 60 MMHG | WEIGHT: 143.25 LBS | SYSTOLIC BLOOD PRESSURE: 110 MMHG

## 2020-06-01 RX ORDER — NORELGESTROMIN AND ETHINYL ESTRADIOL 150; 35 UG/D; UG/D
150-35 PATCH TRANSDERMAL
Qty: 1 | Refills: 7 | Status: ACTIVE | COMMUNITY
Start: 2020-06-01 | End: 1900-01-01

## 2020-06-01 NOTE — HISTORY OF PRESENT ILLNESS
[Postpartum Follow Up] : postpartum follow up [Complications:___] : complications include: [unfilled] [Breastfeeding] : currently nursing [] : delivered by vaginal delivery [BF with Difficulty] : nursing without difficulty [None] : no vaginal bleeding [Back to Normal] : is back to normal in size [de-identified] : no depression [Doing Well] : is doing well [No Sign of Infection] : is showing no signs of infection [de-identified] : discussed patches-aware estrogen increases risk of dvt/pe/mi/stroke, f/u 6 months

## 2020-06-02 LAB — HCG UR QL: NEGATIVE

## 2020-06-02 RX ORDER — NORELGESTROMIN AND ETHINYL ESTRADIOL 150; 35 UG/D; UG/D
150-35 PATCH TRANSDERMAL
Qty: 1 | Refills: 12 | Status: ACTIVE | COMMUNITY
Start: 2020-06-02 | End: 1900-01-01

## 2020-06-24 ENCOUNTER — APPOINTMENT (OUTPATIENT)
Dept: OBGYN | Facility: CLINIC | Age: 25
End: 2020-06-24

## 2020-08-03 ENCOUNTER — APPOINTMENT (OUTPATIENT)
Dept: ANTEPARTUM | Facility: CLINIC | Age: 25
End: 2020-08-03
Payer: MEDICAID

## 2020-08-03 ENCOUNTER — ASOB RESULT (OUTPATIENT)
Age: 25
End: 2020-08-03

## 2020-08-03 ENCOUNTER — OUTPATIENT (OUTPATIENT)
Dept: OUTPATIENT SERVICES | Facility: HOSPITAL | Age: 25
LOS: 1 days | Discharge: HOME | End: 2020-08-03

## 2020-08-03 ENCOUNTER — APPOINTMENT (OUTPATIENT)
Dept: OBGYN | Facility: CLINIC | Age: 25
End: 2020-08-03

## 2020-08-03 ENCOUNTER — APPOINTMENT (OUTPATIENT)
Dept: OBGYN | Facility: CLINIC | Age: 25
End: 2020-08-03
Payer: MEDICAID

## 2020-08-03 VITALS
WEIGHT: 139 LBS | SYSTOLIC BLOOD PRESSURE: 100 MMHG | BODY MASS INDEX: 26.24 KG/M2 | HEART RATE: 68 BPM | RESPIRATION RATE: 14 BRPM | HEIGHT: 61 IN | DIASTOLIC BLOOD PRESSURE: 52 MMHG

## 2020-08-03 DIAGNOSIS — Z30.09 ENCOUNTER FOR OTHER GENERAL COUNSELING AND ADVICE ON CONTRACEPTION: ICD-10-CM

## 2020-08-03 PROCEDURE — 99214 OFFICE O/P EST MOD 30 MIN: CPT

## 2020-08-03 PROCEDURE — 76801 OB US < 14 WKS SINGLE FETUS: CPT | Mod: 26

## 2020-08-03 NOTE — COUNSELING
[STD (testing, results, tx)] : STD (testing, results, tx) [Confidentiality] : confidentiality [Contraception] : contraception [Lab Results] : lab results [Pregnancy Options] : pregnancy options [Method Specific Consent] : method specific consent

## 2020-08-04 DIAGNOSIS — Z34.90 ENCOUNTER FOR SUPERVISION OF NORMAL PREGNANCY, UNSPECIFIED, UNSPECIFIED TRIMESTER: ICD-10-CM

## 2020-08-04 DIAGNOSIS — Z3A.12 12 WEEKS GESTATION OF PREGNANCY: ICD-10-CM

## 2020-08-05 ENCOUNTER — APPOINTMENT (OUTPATIENT)
Dept: OBGYN | Facility: CLINIC | Age: 25
End: 2020-08-05

## 2020-08-17 DIAGNOSIS — O36.80X0 PREGNANCY WITH INCONCLUSIVE FETAL VIABILITY, NOT APPLICABLE OR UNSPECIFIED: ICD-10-CM

## 2020-08-17 DIAGNOSIS — Z30.09 ENCOUNTER FOR OTHER GENERAL COUNSELING AND ADVICE ON CONTRACEPTION: ICD-10-CM

## 2020-08-17 DIAGNOSIS — Z33.2 ENCOUNTER FOR ELECTIVE TERMINATION OF PREGNANCY: ICD-10-CM

## 2020-12-14 ENCOUNTER — APPOINTMENT (OUTPATIENT)
Dept: OBGYN | Facility: CLINIC | Age: 25
End: 2020-12-14
Payer: MEDICAID

## 2020-12-14 ENCOUNTER — OUTPATIENT (OUTPATIENT)
Dept: OUTPATIENT SERVICES | Facility: HOSPITAL | Age: 25
LOS: 1 days | Discharge: HOME | End: 2020-12-14

## 2020-12-14 VITALS — WEIGHT: 146 LBS | DIASTOLIC BLOOD PRESSURE: 60 MMHG | SYSTOLIC BLOOD PRESSURE: 100 MMHG

## 2020-12-14 DIAGNOSIS — Z00.00 ENCOUNTER FOR GENERAL ADULT MEDICAL EXAMINATION W/OUT ABNORMAL FINDINGS: ICD-10-CM

## 2020-12-14 DIAGNOSIS — O36.80X0 PREGNANCY WITH INCONCLUSIVE FETAL VIABILITY, NOT APPLICABLE OR UNSPECIFIED: ICD-10-CM

## 2020-12-14 DIAGNOSIS — Z33.2 ENCOUNTER FOR ELECTIVE TERMINATION OF PREGNANCY: ICD-10-CM

## 2020-12-14 PROCEDURE — 99395 PREV VISIT EST AGE 18-39: CPT

## 2020-12-14 NOTE — HISTORY OF PRESENT ILLNESS
[Y] : Patient is sexually active [PapSmeardate] : 2019 [TextBox_31] : lgsil [FreeTextEntry1] : on pills wants iud-will get in 2 weeks

## 2020-12-21 LAB — HPV HIGH+LOW RISK DNA PNL CVX: NOT DETECTED

## 2020-12-23 ENCOUNTER — APPOINTMENT (OUTPATIENT)
Dept: OBGYN | Facility: CLINIC | Age: 25
End: 2020-12-23
Payer: MEDICAID

## 2020-12-23 ENCOUNTER — RESULT CHARGE (OUTPATIENT)
Age: 25
End: 2020-12-23

## 2020-12-23 ENCOUNTER — OUTPATIENT (OUTPATIENT)
Dept: OUTPATIENT SERVICES | Facility: HOSPITAL | Age: 25
LOS: 1 days | Discharge: HOME | End: 2020-12-23

## 2020-12-23 VITALS — SYSTOLIC BLOOD PRESSURE: 118 MMHG | DIASTOLIC BLOOD PRESSURE: 70 MMHG | WEIGHT: 146 LBS

## 2020-12-23 DIAGNOSIS — Z30.430 ENCOUNTER FOR INSERTION OF INTRAUTERINE CONTRACEPTIVE DEVICE: ICD-10-CM

## 2020-12-23 LAB — HCG UR QL: NEGATIVE

## 2020-12-23 PROCEDURE — 58300 INSERT INTRAUTERINE DEVICE: CPT

## 2020-12-23 NOTE — PROCEDURE
[IUD Placement] : intrauterine device (IUD) placement [Prevention of Pregnancy] : prevention of pregnancy [Risks] : risks [Benefits] : benefits [Alternatives] : alternatives [Infection] : infection [Bleeding] : bleeding [Pain] : pain [Expulsion] : expulsion [Failure] : failure [Uterine Perforation] : uterine perforation [Neg Pregnancy Test] : negative pregnancy test [Betadine] : Betadine [Tenaculum] : Tenaculum [Easy Passage] : Easy passage [ParaGard IUD] : ParaGard IUD [Tolerated Well] : Patient tolerated the procedure well [No Complications] : No complications [None] : None

## 2021-01-20 ENCOUNTER — APPOINTMENT (OUTPATIENT)
Dept: OBGYN | Facility: CLINIC | Age: 26
End: 2021-01-20

## 2021-08-06 ENCOUNTER — NON-APPOINTMENT (OUTPATIENT)
Age: 26
End: 2021-08-06

## 2021-11-08 ENCOUNTER — TRANSCRIPTION ENCOUNTER (OUTPATIENT)
Age: 26
End: 2021-11-08

## 2021-11-16 ENCOUNTER — APPOINTMENT (OUTPATIENT)
Dept: OBGYN | Facility: CLINIC | Age: 26
End: 2021-11-16

## 2022-02-02 PROBLEM — Z00.00 ENCOUNTER FOR PREVENTIVE HEALTH EXAMINATION: Status: ACTIVE | Noted: 2022-02-02

## 2022-02-03 ENCOUNTER — APPOINTMENT (OUTPATIENT)
Dept: OBGYN | Facility: CLINIC | Age: 27
End: 2022-02-03
Payer: MEDICAID

## 2022-02-03 ENCOUNTER — NON-APPOINTMENT (OUTPATIENT)
Age: 27
End: 2022-02-03

## 2022-02-03 PROCEDURE — 87490 CHLMYD TRACH DNA DIR PROBE: CPT

## 2022-02-03 PROCEDURE — 99385 PREV VISIT NEW AGE 18-39: CPT

## 2022-02-03 PROCEDURE — 99395 PREV VISIT EST AGE 18-39: CPT

## 2022-02-03 PROCEDURE — 81000 URINALYSIS NONAUTO W/SCOPE: CPT

## 2022-02-17 ENCOUNTER — APPOINTMENT (OUTPATIENT)
Dept: OBGYN | Facility: CLINIC | Age: 27
End: 2022-02-17

## 2022-06-02 ENCOUNTER — APPOINTMENT (OUTPATIENT)
Dept: OBGYN | Facility: CLINIC | Age: 27
End: 2022-06-02

## 2022-06-07 ENCOUNTER — APPOINTMENT (OUTPATIENT)
Dept: OBGYN | Facility: CLINIC | Age: 27
End: 2022-06-07
Payer: MEDICAID

## 2022-06-07 PROCEDURE — 58301 REMOVE INTRAUTERINE DEVICE: CPT

## 2022-06-07 PROCEDURE — 99213 OFFICE O/P EST LOW 20 MIN: CPT | Mod: 25

## 2022-06-16 ENCOUNTER — APPOINTMENT (OUTPATIENT)
Dept: OBGYN | Facility: CLINIC | Age: 27
End: 2022-06-16
Payer: MEDICAID

## 2022-06-16 PROCEDURE — 99213 OFFICE O/P EST LOW 20 MIN: CPT

## 2022-06-16 PROCEDURE — 81000 URINALYSIS NONAUTO W/SCOPE: CPT

## 2022-08-05 ENCOUNTER — NON-APPOINTMENT (OUTPATIENT)
Age: 27
End: 2022-08-05

## 2022-08-08 ENCOUNTER — LABORATORY RESULT (OUTPATIENT)
Age: 27
End: 2022-08-08

## 2022-08-08 ENCOUNTER — APPOINTMENT (OUTPATIENT)
Dept: OBGYN | Facility: CLINIC | Age: 27
End: 2022-08-08

## 2022-08-08 DIAGNOSIS — N91.2 AMENORRHEA, UNSPECIFIED: ICD-10-CM

## 2022-08-08 PROCEDURE — 99213 OFFICE O/P EST LOW 20 MIN: CPT

## 2022-08-08 PROCEDURE — 81025 URINE PREGNANCY TEST: CPT

## 2022-08-15 ENCOUNTER — NON-APPOINTMENT (OUTPATIENT)
Age: 27
End: 2022-08-15

## 2022-08-15 ENCOUNTER — APPOINTMENT (OUTPATIENT)
Dept: OBGYN | Facility: CLINIC | Age: 27
End: 2022-08-15

## 2022-08-15 PROCEDURE — 76830 TRANSVAGINAL US NON-OB: CPT

## 2022-08-15 PROCEDURE — 93975 VASCULAR STUDY: CPT

## 2022-08-15 PROCEDURE — 99213 OFFICE O/P EST LOW 20 MIN: CPT

## 2022-10-19 LAB
ALBUMIN SERPL ELPH-MCNC: 4.7 G/DL
ALP BLD-CCNC: 100 U/L
ALT SERPL-CCNC: 46 U/L
ANION GAP SERPL CALC-SCNC: 14 MMOL/L
AST SERPL-CCNC: 30 U/L
BASOPHILS # BLD AUTO: 0.03 K/UL
BASOPHILS NFR BLD AUTO: 0.3 %
BILIRUB SERPL-MCNC: 0.4 MG/DL
BUN SERPL-MCNC: 5 MG/DL
CALCIUM SERPL-MCNC: 9.5 MG/DL
CHLORIDE SERPL-SCNC: 103 MMOL/L
CO2 SERPL-SCNC: 24 MMOL/L
CREAT SERPL-MCNC: 0.7 MG/DL
EGFR: 123 ML/MIN/1.73M2
EOSINOPHIL # BLD AUTO: 0.14 K/UL
EOSINOPHIL NFR BLD AUTO: 1.5 %
ESTIMATED AVERAGE GLUCOSE: 103 MG/DL
ESTROGEN SERPL-MCNC: 122 PG/ML
FSH SERPL-MCNC: 5 IU/L
GLUCOSE SERPL-MCNC: 86 MG/DL
HBA1C MFR BLD HPLC: 5.2 %
HCG SERPL-MCNC: <0.6 MIU/ML
HCT VFR BLD CALC: 40.6 %
HGB BLD-MCNC: 12.4 G/DL
IMM GRANULOCYTES NFR BLD AUTO: 0.3 %
LH SERPL-ACNC: 11.6 IU/L
LYMPHOCYTES # BLD AUTO: 2.7 K/UL
LYMPHOCYTES NFR BLD AUTO: 29.8 %
MAN DIFF?: NORMAL
MCHC RBC-ENTMCNC: 26.8 PG
MCHC RBC-ENTMCNC: 30.5 G/DL
MCV RBC AUTO: 87.9 FL
MONOCYTES # BLD AUTO: 0.45 K/UL
MONOCYTES NFR BLD AUTO: 5 %
NEUTROPHILS # BLD AUTO: 5.72 K/UL
NEUTROPHILS NFR BLD AUTO: 63.1 %
PLATELET # BLD AUTO: 229 K/UL
POTASSIUM SERPL-SCNC: 4.2 MMOL/L
PROGEST SERPL-MCNC: 0.1 NG/ML
PROLACTIN SERPL-MCNC: 15.8 NG/ML
PROT SERPL-MCNC: 7.8 G/DL
RBC # BLD: 4.62 M/UL
RBC # FLD: 14.6 %
SODIUM SERPL-SCNC: 141 MMOL/L
TSH SERPL-ACNC: 4.65 UIU/ML
WBC # FLD AUTO: 9.07 K/UL

## 2022-11-21 ENCOUNTER — EMERGENCY (EMERGENCY)
Facility: HOSPITAL | Age: 27
LOS: 0 days | Discharge: LEFT BEFORE DISCHARGE | End: 2022-11-21
Attending: EMERGENCY MEDICINE | Admitting: EMERGENCY MEDICINE

## 2022-11-21 VITALS
DIASTOLIC BLOOD PRESSURE: 90 MMHG | WEIGHT: 154.98 LBS | OXYGEN SATURATION: 99 % | RESPIRATION RATE: 18 BRPM | TEMPERATURE: 96 F | HEART RATE: 101 BPM | SYSTOLIC BLOOD PRESSURE: 137 MMHG

## 2022-11-21 DIAGNOSIS — K08.89 OTHER SPECIFIED DISORDERS OF TEETH AND SUPPORTING STRUCTURES: ICD-10-CM

## 2022-11-21 DIAGNOSIS — Z53.21 PROCEDURE AND TREATMENT NOT CARRIED OUT DUE TO PATIENT LEAVING PRIOR TO BEING SEEN BY HEALTH CARE PROVIDER: ICD-10-CM

## 2022-11-21 PROCEDURE — L9992: CPT

## 2022-11-21 NOTE — ED ADULT TRIAGE NOTE - RESPIRATORY RATE (BREATHS/MIN)
Detail Level: Simple Comment: Discussed I&D, Spironolactone, Accutane or ILK injection as a treatment option today. Risk and benefits reviewed.\\n\\nPatient elects I&D today. 18 Comment: Patient would like to think about doing either Spironolactone or Accutane as a treatment option and follow up.

## 2023-08-08 ENCOUNTER — APPOINTMENT (OUTPATIENT)
Dept: OBGYN | Facility: CLINIC | Age: 28
End: 2023-08-08
Payer: MEDICAID

## 2023-08-08 ENCOUNTER — LABORATORY RESULT (OUTPATIENT)
Age: 28
End: 2023-08-08

## 2023-08-08 DIAGNOSIS — Z01.419 ENCOUNTER FOR GYNECOLOGICAL EXAMINATION (GENERAL) (ROUTINE) W/OUT ABNORMAL FINDINGS: ICD-10-CM

## 2023-08-08 PROCEDURE — 99395 PREV VISIT EST AGE 18-39: CPT

## 2023-08-08 NOTE — HISTORY OF PRESENT ILLNESS
[FreeTextEntry1] : ----   27 Y/O  P1 HERE  FOR CHECK UP.LMP 6/15/23;UCG NEG;HX OF PCOS PMHX;/ PSHX;/ SOCIAL;-ETOH   -CIGG        STD;  HPV            DISCUSSED CONDOMS FAMILY HX OF BREAST CANCER;NO REVIEW OF SYMPTOMS DONE ALLERGIES;  Patient has answered NKDA Medication reconciliation was completed by reviewing, with the patient's involvement, the patient's current outpatient medications and those  ordered for the patient today.   PE; BREASTS;-MASSES DC NODES SBE ABD SOFT NT ND NL GENIT  VAGINA -DC   CX -CMT UTERUS   NL SIZE NON TENDER ADNEXA NT -MASSES  A;P;CHECK UP -PAP GC CHLAMYDIA -RTC FOR PARARGARD IUD RBA DISCUSSED -F-U PRN.

## 2023-08-21 LAB
C TRACH RRNA SPEC QL NAA+PROBE: NOT DETECTED
CYTOLOGY CVX/VAG DOC THIN PREP: NORMAL
HPV HIGH+LOW RISK DNA PNL CVX: DETECTED
N GONORRHOEA RRNA SPEC QL NAA+PROBE: NOT DETECTED
SOURCE AMPLIFICATION: NORMAL

## 2023-08-29 ENCOUNTER — APPOINTMENT (OUTPATIENT)
Dept: OBGYN | Facility: CLINIC | Age: 28
End: 2023-08-29
Payer: MEDICAID

## 2023-08-29 PROCEDURE — 76830 TRANSVAGINAL US NON-OB: CPT

## 2023-08-29 PROCEDURE — 99213 OFFICE O/P EST LOW 20 MIN: CPT

## 2023-08-29 NOTE — HISTORY OF PRESENT ILLNESS
[FreeTextEntry1] : ----   27 Y/O  P1 HERE  FOR F-U;HX OF PCOS  PT C/O PELVIC CRAMPS ON AND OFF;-NVFC. PMHX;/ PSHX;/ SOCIAL;-ETOH   -CIGG        STD;  HPV            DISCUSSED CONDOMS FAMILY HX OF BREAST CANCER;NO REVIEW OF SYMPTOMS DONE ALLERGIES;  Patient has answered NKDA Medication reconciliation was completed by reviewing, with the patient's involvement, the patient's current outpatient medications and those  ordered for the patient today.   PE; ABD SOFT NT ND EXT -CCE  A;P;PELVIC PAIN,PCOS -SONO REVIEWED -RTC FOR IUD INSERTION -ANSWERED QUESTIONS.

## 2023-09-14 ENCOUNTER — APPOINTMENT (OUTPATIENT)
Dept: OBGYN | Facility: CLINIC | Age: 28
End: 2023-09-14

## 2023-09-26 ENCOUNTER — APPOINTMENT (OUTPATIENT)
Dept: OBGYN | Facility: CLINIC | Age: 28
End: 2023-09-26
Payer: MEDICAID

## 2023-09-26 PROCEDURE — 99213 OFFICE O/P EST LOW 20 MIN: CPT | Mod: 25

## 2023-09-26 PROCEDURE — 58300 INSERT INTRAUTERINE DEVICE: CPT

## 2023-10-10 ENCOUNTER — APPOINTMENT (OUTPATIENT)
Dept: OBGYN | Facility: CLINIC | Age: 28
End: 2023-10-10
Payer: MEDICAID

## 2023-10-10 DIAGNOSIS — R10.2 PELVIC AND PERINEAL PAIN: ICD-10-CM

## 2023-10-10 PROCEDURE — 76830 TRANSVAGINAL US NON-OB: CPT

## 2023-10-10 PROCEDURE — 99213 OFFICE O/P EST LOW 20 MIN: CPT

## 2023-12-21 ENCOUNTER — NON-APPOINTMENT (OUTPATIENT)
Age: 28
End: 2023-12-21

## 2024-03-07 ENCOUNTER — APPOINTMENT (OUTPATIENT)
Dept: OBGYN | Facility: CLINIC | Age: 29
End: 2024-03-07

## 2024-03-18 NOTE — ED ADULT NURSE NOTE - NSSEPSISSUSPECTED_ED_A_ED
Pt seen for PT IE home visit on Monday 03/18/24    Pt currently is at functional mobility baseline with use of RW. Pt and pts family report no falls since being home from the hospital. Pt is able to perform transfers, gait training with use of RW and bed mobility completely (I)ly. No reports and no observation of fatigue, SOB and weakness. BLE MMT WNL. No LOB noted and/or observed during upright mobility. Pt able to go up and down 12 st eps with use of B HR needing S level of A.  Per pts family, pt has needed A for bathing and going up and down steps since prior to the recent hospital admission. Pt has available daily A, support and care from family. Pts daughter lives locally and A pt with bathing. pt lives with family who A daily for meals, cooking and cleaning.    No skilled home PT and no skilled home OT needs at this time. DC pt from skilled home PT and skilled [facility] OT. Pt and pts family agreed with DC planning.    Thank you,  Meaghan Gomez  DPT No

## 2024-04-11 NOTE — OB PROVIDER DELIVERY SUMMARY - NSPPHNORISK_OBGYN_ALL_OB
Home nurse noting general decreased appetite, nausea that waxes and wanes during the day.  Patient denies vomiting, abdominal pain.  She is on Miralax daily, stools are semi loose and patient is requesting to change to daily PRN.  Nurse is requesting a [facility] PRN order.  I suggested protein supplements.  Premier Protein daily.  Claudia will discuss with daughter to provide.  Informed daughter who is out of state could order from [employer].  
Orders sent to [facility] and pharmacy per direction of MINESH Hamilton NP.  
Yes, OK to change miralax PRN. OK to order zofran 4 mg ODT 1 tab Q12H PRN nausea/vomiting. She had reported at our visit that the thermostat in her apartment was set to 80 and she had requested it to be lowered. Can we verify if this happened?    AK
In my judgment no risk for PPH has been identified at this time.

## 2024-06-19 ENCOUNTER — NON-APPOINTMENT (OUTPATIENT)
Age: 29
End: 2024-06-19

## 2024-06-20 ENCOUNTER — EMERGENCY (EMERGENCY)
Facility: HOSPITAL | Age: 29
LOS: 0 days | Discharge: HOME | End: 2024-06-20
Attending: EMERGENCY MEDICINE
Payer: MEDICAID

## 2024-06-20 VITALS
HEART RATE: 82 BPM | OXYGEN SATURATION: 98 % | SYSTOLIC BLOOD PRESSURE: 134 MMHG | DIASTOLIC BLOOD PRESSURE: 81 MMHG | TEMPERATURE: 99 F | RESPIRATION RATE: 18 BRPM

## 2024-06-20 VITALS
HEART RATE: 83 BPM | RESPIRATION RATE: 16 BRPM | SYSTOLIC BLOOD PRESSURE: 112 MMHG | TEMPERATURE: 98 F | OXYGEN SATURATION: 100 % | DIASTOLIC BLOOD PRESSURE: 62 MMHG

## 2024-06-20 DIAGNOSIS — R10.31 RIGHT LOWER QUADRANT PAIN: ICD-10-CM

## 2024-06-20 DIAGNOSIS — R10.11 RIGHT UPPER QUADRANT PAIN: ICD-10-CM

## 2024-06-20 LAB
ALBUMIN SERPL ELPH-MCNC: 4.6 G/DL — SIGNIFICANT CHANGE UP (ref 3.5–5.2)
ALP SERPL-CCNC: 119 U/L — HIGH (ref 30–115)
ALT FLD-CCNC: 21 U/L — SIGNIFICANT CHANGE UP (ref 0–41)
ANION GAP SERPL CALC-SCNC: 15 MMOL/L — HIGH (ref 7–14)
AST SERPL-CCNC: 23 U/L — SIGNIFICANT CHANGE UP (ref 0–41)
BASOPHILS # BLD AUTO: 0.03 K/UL — SIGNIFICANT CHANGE UP (ref 0–0.2)
BASOPHILS NFR BLD AUTO: 0.4 % — SIGNIFICANT CHANGE UP (ref 0–1)
BILIRUB SERPL-MCNC: 0.3 MG/DL — SIGNIFICANT CHANGE UP (ref 0.2–1.2)
BUN SERPL-MCNC: 8 MG/DL — LOW (ref 10–20)
CALCIUM SERPL-MCNC: 9.2 MG/DL — SIGNIFICANT CHANGE UP (ref 8.4–10.5)
CHLORIDE SERPL-SCNC: 101 MMOL/L — SIGNIFICANT CHANGE UP (ref 98–110)
CO2 SERPL-SCNC: 22 MMOL/L — SIGNIFICANT CHANGE UP (ref 17–32)
CREAT SERPL-MCNC: 0.7 MG/DL — SIGNIFICANT CHANGE UP (ref 0.7–1.5)
EGFR: 121 ML/MIN/1.73M2 — SIGNIFICANT CHANGE UP
EOSINOPHIL # BLD AUTO: 0.16 K/UL — SIGNIFICANT CHANGE UP (ref 0–0.7)
EOSINOPHIL NFR BLD AUTO: 1.9 % — SIGNIFICANT CHANGE UP (ref 0–8)
GLUCOSE SERPL-MCNC: 88 MG/DL — SIGNIFICANT CHANGE UP (ref 70–99)
HCG SERPL QL: NEGATIVE — SIGNIFICANT CHANGE UP
HCT VFR BLD CALC: 34.2 % — LOW (ref 37–47)
HGB BLD-MCNC: 10.5 G/DL — LOW (ref 12–16)
IMM GRANULOCYTES NFR BLD AUTO: 0.2 % — SIGNIFICANT CHANGE UP (ref 0.1–0.3)
LYMPHOCYTES # BLD AUTO: 2.75 K/UL — SIGNIFICANT CHANGE UP (ref 1.2–3.4)
LYMPHOCYTES # BLD AUTO: 33.4 % — SIGNIFICANT CHANGE UP (ref 20.5–51.1)
MCHC RBC-ENTMCNC: 24.6 PG — LOW (ref 27–31)
MCHC RBC-ENTMCNC: 30.7 G/DL — LOW (ref 32–37)
MCV RBC AUTO: 80.3 FL — LOW (ref 81–99)
MONOCYTES # BLD AUTO: 0.37 K/UL — SIGNIFICANT CHANGE UP (ref 0.1–0.6)
MONOCYTES NFR BLD AUTO: 4.5 % — SIGNIFICANT CHANGE UP (ref 1.7–9.3)
NEUTROPHILS # BLD AUTO: 4.9 K/UL — SIGNIFICANT CHANGE UP (ref 1.4–6.5)
NEUTROPHILS NFR BLD AUTO: 59.6 % — SIGNIFICANT CHANGE UP (ref 42.2–75.2)
NRBC # BLD: 0 /100 WBCS — SIGNIFICANT CHANGE UP (ref 0–0)
PLATELET # BLD AUTO: 228 K/UL — SIGNIFICANT CHANGE UP (ref 130–400)
PMV BLD: 12.3 FL — HIGH (ref 7.4–10.4)
POTASSIUM SERPL-MCNC: 4.2 MMOL/L — SIGNIFICANT CHANGE UP (ref 3.5–5)
POTASSIUM SERPL-SCNC: 4.2 MMOL/L — SIGNIFICANT CHANGE UP (ref 3.5–5)
PROT SERPL-MCNC: 7.8 G/DL — SIGNIFICANT CHANGE UP (ref 6–8)
RBC # BLD: 4.26 M/UL — SIGNIFICANT CHANGE UP (ref 4.2–5.4)
RBC # FLD: 13.9 % — SIGNIFICANT CHANGE UP (ref 11.5–14.5)
SODIUM SERPL-SCNC: 138 MMOL/L — SIGNIFICANT CHANGE UP (ref 135–146)
WBC # BLD: 8.23 K/UL — SIGNIFICANT CHANGE UP (ref 4.8–10.8)
WBC # FLD AUTO: 8.23 K/UL — SIGNIFICANT CHANGE UP (ref 4.8–10.8)

## 2024-06-20 PROCEDURE — 74177 CT ABD & PELVIS W/CONTRAST: CPT | Mod: MC

## 2024-06-20 PROCEDURE — 74177 CT ABD & PELVIS W/CONTRAST: CPT | Mod: 26,MC

## 2024-06-20 PROCEDURE — 36415 COLL VENOUS BLD VENIPUNCTURE: CPT

## 2024-06-20 PROCEDURE — 99284 EMERGENCY DEPT VISIT MOD MDM: CPT | Mod: 25

## 2024-06-20 PROCEDURE — 85025 COMPLETE CBC W/AUTO DIFF WBC: CPT

## 2024-06-20 PROCEDURE — 84703 CHORIONIC GONADOTROPIN ASSAY: CPT

## 2024-06-20 PROCEDURE — 80053 COMPREHEN METABOLIC PANEL: CPT

## 2024-06-20 PROCEDURE — 99285 EMERGENCY DEPT VISIT HI MDM: CPT

## 2024-06-20 NOTE — ED PROVIDER NOTE - CLINICAL SUMMARY MEDICAL DECISION MAKING FREE TEXT BOX
29-year-old female no past medical history presents to the emergency department with right-sided abdominal pain.  Symptoms have been going on for the past day.  Last menstrual period was couple weeks ago.  Patient denies any urinary symptoms and has no history of kidney stones.  Patient is otherwise eating and drinking normally and denies any fever or chills, diarrhea, vomiting, chest pain, shortness of breath.  Patient never had pain like this before.    On exam, vital signs were reviewed.  Patient is nontoxic-appearing.  Patient mild tenderness to right lower quadrant and right flank area.  Negative Saenz sign.  Patient has a soft abdomen with no guarding or rebound.  Large-bore IV access obtained and full labs sent, urine sent, patient had CT scan of abdomen pelvis.  ED workup negative for any serious findings.  Will discharge patient with appropriate home care and follow-up and patient aware she may require further workup.    Full DC instructions discussed and patient knows when to seek immediate medical attention.  Patient has proper follow up.  All results discussed and patient aware they may require further work up.  Proper follow up ensured. Limitations of ED work up discussed.  Medications administered and prescribed/OTC home meds discussed.  All questions and concerns from patient or family addressed. Understanding of instructions verbalized.

## 2024-06-20 NOTE — ED PROVIDER NOTE - ATTENDING CONTRIBUTION TO CARE
I personally evaluated patient. I agree with the findings and plan with all documentation on chart except as documented  in my note.    29-year-old female no past medical history presents to the emergency department with right-sided abdominal pain.  Symptoms have been going on for the past day.  Last menstrual period was couple weeks ago.  Patient denies any urinary symptoms and has no history of kidney stones.  Patient is otherwise eating and drinking normally and denies any fever or chills, diarrhea, vomiting, chest pain, shortness of breath.  Patient never had pain like this before.    On exam, vital signs were reviewed.  Patient is nontoxic-appearing.  Patient mild tenderness to right lower quadrant and right flank area.  Negative Saenz sign.  Patient has a soft abdomen with no guarding or rebound.  Large-bore IV access obtained and full labs sent, urine sent, patient had CT scan of abdomen pelvis.  ED workup negative for any serious findings.  Will discharge patient with appropriate home care and follow-up and patient aware she may require further workup.    Full DC instructions discussed and patient knows when to seek immediate medical attention.  Patient has proper follow up.  All results discussed and patient aware they may require further work up.  Proper follow up ensured. Limitations of ED work up discussed.  Medications administered and prescribed/OTC home meds discussed.  All questions and concerns from patient or family addressed. Understanding of instructions verbalized.

## 2024-06-20 NOTE — ED PROVIDER NOTE - OBJECTIVE STATEMENT
Patient is a 29-year-old female no past medical history presenting to ED complaining of abdominal pain.  Patient states yesterday she started to experience right-sided flank pain that is nonradiating and worse with movement.  Patient states pain is not worse after she eats.  LMP 6/5.  No dysuria, hematuria, nausea, vomiting, diarrhea, chills, shortness of breath, chest pain, vaginal discharge, vaginal bleed
hide

## 2024-06-20 NOTE — ED PROVIDER NOTE - CARE PROVIDER_API CALL
Chan, Yeoman [facility]  Family Medicine  [address]  [place] [address]  Phone: [phone]  Fax: [phone]  Follow Up Time: 1-3 Days

## 2024-06-20 NOTE — ED PROVIDER NOTE - PATIENT PORTAL LINK FT
You can access the FollowMyHealth Patient Portal offered by [facility] by registering at the following website: [email or web address]. By joining [employer]’s FollowMyHealth portal, you will also be able to view your health information using other applications (apps) compatible with our system.

## 2024-06-20 NOTE — ED ADULT NURSE NOTE - NSICDXFAMILYHX_GEN_ALL_CORE_FT
FAMILY HISTORY:  Family history of hypertension    Mother  Still living? Unknown  Family history of diabetes mellitus, Age at diagnosis: Age Unknown

## 2024-06-20 NOTE — ED PROVIDER NOTE - PHYSICAL EXAMINATION
CONSTITUTIONAL: well-appearing, in NAD  SKIN: Warm dry, normal skin turgor  HEAD: NCAT  EYES: EOMI, PERRLA, no scleral icterus, conjunctiva pink  ENT: normal pharynx with no erythema or exudates  NECK: Supple; non tender. Full ROM.  CARD: RRR, .  RESP: clear to ausculation b/l. No crackles or wheezing.  ABD: soft, RLQ, RUQ, non-distended, no rebound or guarding. No CVA TTP  EXT: Full ROM, no bony tenderness, no pedal edema, no calf tenderness  NEURO: normal motor. normal sensory.  Normal gait.  PSYCH: Cooperative, appropriate.

## 2024-06-20 NOTE — ED PROVIDER NOTE - DIFFERENTIAL DIAGNOSIS
Pt transported to [facility] on cart in stable condition. Floor contacted before transport and spoke with Ross Nieto.      Nilton Will  07/06/22 [id number] The differential diagnosis for patients clinical presentation includes but is not limited to:  colitis, pancreatitis, UTI, infection, Diverticulitis, SBO, biliary colic, cholecystitis, pyelonephritis, aortic dissection Differential Diagnosis

## 2024-07-09 ENCOUNTER — NON-APPOINTMENT (OUTPATIENT)
Age: 29
End: 2024-07-09

## 2024-08-08 ENCOUNTER — APPOINTMENT (OUTPATIENT)
Dept: OBGYN | Facility: CLINIC | Age: 29
End: 2024-08-08

## 2024-08-15 ENCOUNTER — APPOINTMENT (OUTPATIENT)
Dept: OBGYN | Facility: CLINIC | Age: 29
End: 2024-08-15
Payer: MEDICAID

## 2024-08-15 PROCEDURE — 99213 OFFICE O/P EST LOW 20 MIN: CPT

## 2024-08-19 ENCOUNTER — APPOINTMENT (OUTPATIENT)
Dept: OBGYN | Facility: CLINIC | Age: 29
End: 2024-08-19
Payer: MEDICAID

## 2024-08-19 DIAGNOSIS — N92.6 IRREGULAR MENSTRUATION, UNSPECIFIED: ICD-10-CM

## 2024-08-19 PROCEDURE — 58301 REMOVE INTRAUTERINE DEVICE: CPT

## 2024-08-19 PROCEDURE — 99213 OFFICE O/P EST LOW 20 MIN: CPT | Mod: 25

## 2024-08-19 RX ORDER — NORGESTIMATE AND ETHINYL ESTRADIOL 0.25-0.035
0.25-35 KIT ORAL
Qty: 84 | Refills: 0 | Status: ACTIVE | COMMUNITY
Start: 2024-08-19 | End: 1900-01-01

## 2024-08-19 NOTE — HISTORY OF PRESENT ILLNESS
[FreeTextEntry1] : ----   30 Y/O  P1 HERE  FOR IUD REMOVAL;INFORMED CONSENT OBTAINED RBA DISCUSSED;PT  C/O IRREG BLEEDING AFTER IUD INSERTION.PT C/O HEAVY PERIODS .. PMHX;/ PSHX;/ SOCIAL;-ETOH   -CIGG        STD;  HPV            DISCUSSED CONDOMS FAMILY HX OF BREAST CANCER;NO REVIEW OF SYMPTOMS DONE ALLERGIES;  Patient has answered NKDA Medication reconciliation was completed by reviewing, with the patient's involvement, the patient's current outpatient medications and those  ordered for the patient today.   PE; ABD SOFT NT ND NL GENITALIA VAGINA -DC CX-CMT UTERUS NL SIZE NT ADNEXA NT - MASSES EXT -CCE   -TIME OUT DONE  EBL LESS THAN 5CC -IUD REMOVED WITHOUT INCIDENT -INSTRUCTIONS REVIEWED -SPRINTEC RBA DISCUSSED -SONO -F-U AFTER ABOVE.

## 2024-09-28 ENCOUNTER — NON-APPOINTMENT (OUTPATIENT)
Age: 29
End: 2024-09-28

## 2024-10-23 ENCOUNTER — APPOINTMENT (OUTPATIENT)
Dept: UROLOGY | Facility: CLINIC | Age: 29
End: 2024-10-23

## 2024-12-22 ENCOUNTER — NON-APPOINTMENT (OUTPATIENT)
Age: 29
End: 2024-12-22

## 2025-02-15 ENCOUNTER — NON-APPOINTMENT (OUTPATIENT)
Age: 30
End: 2025-02-15

## 2025-05-20 ENCOUNTER — NON-APPOINTMENT (OUTPATIENT)
Age: 30
End: 2025-05-20